# Patient Record
Sex: FEMALE | Race: WHITE | NOT HISPANIC OR LATINO | Employment: FULL TIME | URBAN - NONMETROPOLITAN AREA
[De-identification: names, ages, dates, MRNs, and addresses within clinical notes are randomized per-mention and may not be internally consistent; named-entity substitution may affect disease eponyms.]

---

## 2017-03-03 ENCOUNTER — HISTORY (OUTPATIENT)
Dept: FAMILY MEDICINE | Facility: OTHER | Age: 32
End: 2017-03-03

## 2017-03-03 ENCOUNTER — AMBULATORY - GICH (OUTPATIENT)
Dept: FAMILY MEDICINE | Facility: OTHER | Age: 32
End: 2017-03-03

## 2017-03-03 DIAGNOSIS — Z30.46 ENCOUNTER FOR SURVEILLANCE OF IMPLANTABLE SUBDERMAL CONTRACEPTIVE: ICD-10-CM

## 2017-06-05 ENCOUNTER — OFFICE VISIT - GICH (OUTPATIENT)
Dept: FAMILY MEDICINE | Facility: OTHER | Age: 32
End: 2017-06-05

## 2017-06-05 ENCOUNTER — HISTORY (OUTPATIENT)
Dept: FAMILY MEDICINE | Facility: OTHER | Age: 32
End: 2017-06-05

## 2017-06-05 DIAGNOSIS — J02.9 ACUTE PHARYNGITIS: ICD-10-CM

## 2017-06-05 DIAGNOSIS — J02.0 STREPTOCOCCAL PHARYNGITIS: ICD-10-CM

## 2017-06-05 LAB — STREP A ANTIGEN - HISTORICAL: POSITIVE

## 2017-12-22 ENCOUNTER — COMMUNICATION - GICH (OUTPATIENT)
Dept: FAMILY MEDICINE | Facility: OTHER | Age: 32
End: 2017-12-22

## 2017-12-22 DIAGNOSIS — Z87.891 PERSONAL HISTORY OF NICOTINE DEPENDENCE: ICD-10-CM

## 2017-12-27 NOTE — PROGRESS NOTES
Patient Information     Patient Name MRN Sex Alexa Bean [id number] Female 1985      Progress Notes by Margaret Gutierrez NP at 2017 10:00 AM     Author:  Margaret Gutierrez NP Service:  (none) Author Type:  PHYS- Nurse Practitioner     Filed:  2017 11:36 AM Encounter Date:  2017 Status:  Signed     :  Margaret Gutierrez NP (PHYS- Nurse Practitioner)            SUBJECTIVE:    Alexa Abdalla is a 32 y.o. female who presents for sore throat, body ache, uncertain of fever has been taking ibuprofen regularly for sore throat, onset over the weekend. Has a 2-year-old son who is in ,  was close last week for strep. Denies any rash, cough or respiratory symptoms. Has been taking fluids without difficulty.    HPI    No Known Allergies,   Family History       Problem   Relation Age of Onset     Genetic  Other      Positive for HTN       Alcoholism  No Family History    ,   Current Outpatient Prescriptions on File Prior to Visit       Medication  Sig Dispense Refill     acyclovir (ZOVIRAX) 800 mg tablet Take 1 tablet by mouth 2 times daily. 30 tablet 2     norgestrel-ethinyl estradiol, 0.3-30 mg-mcg, (LOW-OGESTREL) 0.3-30 mg-mcg tablet Take 1 tablet by mouth once daily. 3 Packet 4     No current facility-administered medications on file prior to visit.    ,   Current Outpatient Prescriptions:      acyclovir (ZOVIRAX) 800 mg tablet, Take 1 tablet by mouth 2 times daily., Disp: 30 tablet, Rfl: 2     norgestrel-ethinyl estradiol, 0.3-30 mg-mcg, (LOW-OGESTREL) 0.3-30 mg-mcg tablet, Take 1 tablet by mouth once daily., Disp: 3 Packet, Rfl: 4  Medications have been reviewed by me and are current to the best of my knowledge and ability., ,   Patient Active Problem List       Diagnosis  Date Noted     HSV-type I-lip       Type 1, lip        ,   Past Surgical History:      Procedure  Laterality Date     PAST SURGICAL HISTORY      None       NE  DELIVERY W POSTPARTUM CARE  2015            "and   Social History        Substance Use Topics          Smoking status:   Current Every Day Smoker      Packs/day:  0.75      Start date:  11/2/2013      Smokeless tobacco:   Never Used       Comment: Pt quit 6 weeks ago.        Alcohol use   Yes      Comment: Socially         REVIEW OF SYSTEMS:  Review of Systems   Constitutional: Positive for malaise/fatigue.   HENT: Positive for sore throat.    Eyes: Negative.    Respiratory: Negative.    Cardiovascular: Negative.    Gastrointestinal: Negative.    Genitourinary: Negative.    Musculoskeletal: Negative.    Skin: Negative.    Neurological: Negative.    Endo/Heme/Allergies: Negative.    Psychiatric/Behavioral: Negative.        OBJECTIVE:  /60  Pulse 84  Temp 98.6  F (37  C) (Temporal)  Ht 1.63 m (5' 4.17\")  Wt 69.9 kg (154 lb 2 oz)  LMP  (Approximate)  Breastfeeding? No  BMI 26.31 kg/m2    EXAM:   Physical Exam   Constitutional: She is oriented to person, place, and time and well-developed, well-nourished, and in no distress.   HENT:   Head: Normocephalic and atraumatic.   Posterior pharynx injected with bilateral equal tonsillar hypertrophy about 2+, 4 mm pustule medial aspect of right tonsil, uvula midline without edema   Neck: Normal range of motion. Neck supple.   Cardiovascular: Normal rate.    Pulmonary/Chest: Effort normal.   Musculoskeletal: Normal range of motion.   Lymphadenopathy:     She has cervical adenopathy.   Neurological: She is alert and oriented to person, place, and time. Gait normal.   Skin: Skin is warm and dry.   Psychiatric: Mood, memory, affect and judgment normal.   Nursing note and vitals reviewed.      ASSESSMENT/PLAN:    ICD-10-CM    1. Strep pharyngitis J02.0 penicillin g procaine & benzathine 1,200,000 Units injection (BICILLIN-CR)   2. Sore throat J02.9 RAPID STREP WITH REFLEX CULTURE      RAPID STREP WITH REFLEX CULTURE      penicillin g procaine & benzathine 1,200,000 Units injection (BICILLIN-CR)      Results for " orders placed or performed in visit on 06/05/17      RAPID STREP WITH REFLEX CULTURE      Result  Value Ref Range    STREP A ANTIGEN           Positive (A) Negative     Will treat with one-time injection penicillin    Plan:  Continue ibuprofen, frequent fluids and other over-the-counter supportive therapies for comfort    Discussed expected course and return to clinic if not showing improvement 24-48 hours, sooner if any difficulty swallowing or managing saliva    Given hardcopy prescription for 2-year-old son due to exposure and sharing food and liquid items over weekend    Discussed prevention of transmission and okay to return to work and 24 hours after antibiotics started    Will offer treatment if  become symptomatic

## 2017-12-28 NOTE — PATIENT INSTRUCTIONS
Patient Information     Patient Name MRN Alexa Parks [id number] Female 1985      Patient Instructions by Margaret Gutierrez NP at 2017 10:00 AM     Author:  Margaret Gutierrez NP  Service:  (none) Author Type:  PHYS- Nurse Practitioner     Filed:  2017 10:36 AM  Encounter Date:  2017 Status:  Addendum     :  Margaret Gutierrez NP (PHYS- Nurse Practitioner)        Related Notes: Original Note by Margaret Gutierrez NP (PHYS- Nurse Practitioner) filed at 2017 10:36 AM               Index [employer] Related topics   Strep Throat   ________________________________________________________________________  KEY POINTS    Strep throat is an infection of the throat caused by bacteria called group A streptococcus. It is very contagious.    If your healthcare provider suspects you have strep, he or she may prescribe an antibiotic before you have all the results from the lab tests. This medicine may be taken as pills or given as a shot.    Follow the full treatment prescribed by your healthcare provider. If you are taking an antibiotic, take the medicine for as long as your healthcare provider prescribes, even if you feel better. If you stop taking the medicine too soon, you may not kill all of the bacteria and you may get sick again.    Wash your hands often and especially after using the restroom, coughing, sneezing, or blowing your nose. Also wash your hands before eating or touching your mouth, nose, or eyes.  ________________________________________________________________________  What is strep throat?  Strep throat is an infection of the throat caused by bacteria called group A streptococcus.  What is the cause?  Strep infections are very contagious. Strep is passed to others by sneezing, coughing, or touching something with the bacteria on it. Strep can be on surfaces such as toys, cups or plates, doorknobs, or telephones.   Strep throat is common in school-age children. Children under 2 years  old and adults who are not exposed to children are much less likely to get strep throat. Strep is most common from November through April, but it can happen any time of year.  What are the symptoms?  Symptoms may include:    Severe sore throat and painful swallowing    Swollen, tender lymph nodes in the neck    Fever    Headaches    Fine red rash on trunk and arms    Muscle aches    Swollen, red tonsils with white patches  How is it diagnosed?  Your healthcare provider will ask about your symptoms and medical history and examine you. Usually you will have a strep test. Your provider will rub a cotton swab against the back of your throat to get a sample for testing. The results may be available in an hour or less. In some cases, the swab is sent to the lab and tested. If you have an infection, it may take several days to find out what kind of germ is causing it. Knowing what germ is causing the infection helps your provider choose the right medicine to treat it.  How is it treated?  If your healthcare provider suspects you have strep, he or she may prescribe an antibiotic before you have all the results from the lab tests. This medicine may be taken as pills or given as a shot.  The symptoms of strep throat may go away as soon as 24 hours after you start treatment. The symptoms rarely last longer than 5 days.  It is very important to treat strep throat. Not getting treatment for strep throat or not taking all the medicine prescribed can lead to complications that may involve the heart and kidneys.  How can I take care of myself?  Follow the full treatment prescribed by your healthcare provider. In addition:    If you are taking an antibiotic, take the medicine for as long as your healthcare provider prescribes, even if you feel better. If you stop taking the medicine too soon, you may not kill all of the bacteria and you may get sick again.    For a sore throat:    Make sure you have enough fluids. Drink clear soup,  cold drinks, and other clear, nutritious liquids. If eating hurts your throat, don't force yourself to eat solid food. When you are able to eat more foods, choose healthy food to give you strength and to help fight the infection.    Gargle with salt water. You can make your own by mixing 1/2 teaspoon salt with 1 cup of water.    Suck on lozenges or hard candy.    Don't talk a lot. Rest your voice.    Use a humidifier to put more moisture in the air. Avoid steam vaporizers because they can cause burns. Be sure to keep the humidifier clean, as recommended in the 's instructions. It's important to keep bacteria and mold from growing in the water container.    Put warm compresses on your neck.    Do not smoke. Do not breathe second-hand smoke.    If you have a fever, rest and limit your activities until the fever is gone.    Ask your healthcare provider if you can take acetaminophen, aspirin, or ibuprofen to reduce your fever and to relieve pain. Read the label and take as directed. Unless recommended by your healthcare provider, you should not take these medicines for more than 10 days.    Nonsteroidal anti-inflammatory medicines (NSAIDs), such as ibuprofen, naproxen, and aspirin, may cause stomach bleeding and other problems. These risks increase with age.    Acetaminophen may cause liver damage or other problems. Unless recommended by your provider, don't take more than 3000 milligrams (mg) in 24 hours. To make sure you don t take too much, check other medicines you take to see if they also contain acetaminophen. Ask your provider if you need to avoid drinking alcohol while taking this medicine.  Ask your provider:    How and when you will get your test results    How long it will take to recover    If there are activities you should avoid and when you can return to your normal activities    How to take care of yourself at home    What symptoms or problems you should watch for and what to do if you have  them  Make sure you know when you should come back for a checkup. Keep all appointments for provider visits or tests.  How can I help prevent strep throat?   The following suggestions may help you prevent spread of a strep infection to others.    Wash your hands often and especially after using the restroom, coughing, sneezing, or blowing your nose. Also wash your hands before eating or touching your mouth, nose, or eyes.    Avoid close contact with other people, especially kissing and hugging, until you have taken the antibiotic for 24 to 48 hours.    Use paper cups, or separate cups, and paper towels in bathrooms instead of shared drinking cups and hand towels.    Don t share food and eating utensils with others.    Be careful not to let your nose or mouth touch drinking fountains.  Developed by [employer].  Adult Advisor 2016.3 published by [employer].  Last modified: 2016-06-07  Last reviewed: 2014-09-24  This content is reviewed periodically and is subject to change as new health information becomes available. The information is intended to inform and educate and is not a replacement for medical evaluation, advice, diagnosis or treatment by a healthcare professional.  References   Adult Advisor 2016.3 Index    Copyright   2016 [employer], a division of [employer] All rights reserved.

## 2017-12-30 NOTE — NURSING NOTE
Patient Information     Patient Name MRN Alexa Parks [id number] Female 1985      Nursing Note by Karla Mcgraw at 2017 10:00 AM     Author:  Karla Mcgraw Service:  (none) Author Type:  (none)     Filed:  2017 10:21 AM Encounter Date:  2017 Status:  Signed     :  Karla Mcgraw            Patient presents to clinic today for sore throat starting last Friday. She states the discomfort is also radiating up into her ears.    Karla Mcgraw LPN...................2017  10:02 AM

## 2018-01-03 NOTE — PROGRESS NOTES
Patient Information     Patient Name MRN Sex Alexa Bean [id number] Female 1985      Progress Notes by Yudith Warner MD at 3/3/2017  2:15 PM     Author:  Yudith Warner MD Service:  (none) Author Type:  Physician     Filed:  3/3/2017  4:57 PM Encounter Date:  3/3/2017 Status:  Signed     :  Yudith Warner MD (Physician)            SUBJECTIVE:    Alexa Abdalla is a 31 y.o. female who presents for removal of Nexplanon.     HPI Comments: Patient was experiencing excessive menstrual spotting while using Nexplanon. Therefore she has been also on an OCP.  She would like the Nexplanon removed. She is not planning to get pregnant. She will continue to use the birth control pill.   is considering a vasectomy.       REVIEW OF SYSTEMS:  ROS see HPI, ROS otherwise negative.     OBJECTIVE:  /62  Pulse 60  Wt 70.9 kg (156 lb 6.4 oz)  BMI 26.85 kg/m2    EXAM:   Physical Exam   Constitutional: She is oriented to person, place, and time and well-developed, well-nourished, and in no distress.   Eyes: Conjunctivae are normal.   Cardiovascular: Normal rate.    Pulmonary/Chest: Effort normal.   Neurological: She is alert and oriented to person, place, and time.   Skin: No rash noted.   Psychiatric: Mood and affect normal.     L arm is positioned. Nexplanon is palpated easily in the subcutaneous tissue. Distal end is anesthestized with 2mL of lidocaine with epinephrine. Area is sterilized. A #15 blade scalpel is used to make a 1cm longitudinal incision. Nexplanon is dissected out through this site without difficulty. Patient tolerated the procedure well. A compression dressing was placed.           ASSESSMENT/PLAN:    ICD-10-CM    1. Nexplanon removal Z30.46 REMOVE DRUG IMPLANT DEVICE        Plan:  Remove compression dressing in 2-3 hours. Limited lifting in the next 24hours. Follow up as needed with questions and concerns.      Yudith Warner MD

## 2018-01-03 NOTE — NURSING NOTE
Patient Information     Patient Name MRN Alexa Parks [id number] Female 1985      Nursing Note by Dottie Olsen at 3/3/2017  2:15 PM     Author:  Dottie Olsen Service:  (none) Author Type:  (none)     Filed:  3/3/2017  2:59 PM Encounter Date:  3/3/2017 Status:  Signed     :  Dottie Olsen            Patient here for removal of nexplanon.   Dottie Olsen LPN ..........3/3/2017 2:16 PM

## 2018-01-26 VITALS
DIASTOLIC BLOOD PRESSURE: 60 MMHG | WEIGHT: 154.13 LBS | HEART RATE: 84 BPM | BODY MASS INDEX: 26.31 KG/M2 | SYSTOLIC BLOOD PRESSURE: 102 MMHG | HEIGHT: 64 IN | TEMPERATURE: 98.6 F

## 2018-01-26 VITALS
BODY MASS INDEX: 24.5 KG/M2 | SYSTOLIC BLOOD PRESSURE: 106 MMHG | DIASTOLIC BLOOD PRESSURE: 62 MMHG | HEART RATE: 60 BPM | WEIGHT: 156.4 LBS

## 2018-01-31 ENCOUNTER — COMMUNICATION - GICH (OUTPATIENT)
Dept: FAMILY MEDICINE | Facility: OTHER | Age: 33
End: 2018-01-31

## 2018-01-31 DIAGNOSIS — Z30.09 ENCOUNTER FOR OTHER GENERAL COUNSELING AND ADVICE ON CONTRACEPTION: ICD-10-CM

## 2018-02-12 NOTE — TELEPHONE ENCOUNTER
Patient Information     Patient Name MRN Alexa Parks [id number] Female 1985      Telephone Encounter by Katia Pires RN at 2017  4:45 PM     Author:  Katia Pires RN Service:  (none) Author Type:  NURS- Registered Nurse     Filed:  2017  4:47 PM Encounter Date:  2017 Status:  Signed     :  Katia Pires RN (NURS- Registered Nurse)            Medication discontinued 2017  Unable to complete prescription refill per RN Medication Refill Policy.................... Katia Pires RN ....................  2017   4:47 PM

## 2018-02-13 NOTE — TELEPHONE ENCOUNTER
Patient Information     Patient Name MRN Alexa Parks [id number] Female 1985      Telephone Encounter by Katia Pires RN at 2018  8:49 AM     Author:  Katia Pires RN Service:  (none) Author Type:  NURS- Registered Nurse     Filed:  2018  8:58 AM Encounter Date:  2018 Status:  Signed     :  Katia Pires RN (NURS- Registered Nurse)            Dr.Rutherford  Pt is requesting a refill of Acyclovir last filled 2016.  Please review and advise.  Pt is due for annual physical and was sent a reminder letter today.    This is a Refill request from: Brian  Name of Medication: Acyclovir  Quantity requested: 30  Last fill date: 2016  Due for refill: yes  Last visit with AXEL ANTOINE was on: 2016 in [place] [facility]  PCP:  Axel Antoine MD  Controlled Substance Agreement:  NA   Diagnosis r/t this medication request: contraception management     Unable to complete prescription refill per RN Medication Refill Policy.................... Katia Pires RN ....................  2018   8:55 AM      Hormones    Office visit in the past 12 months or per provider note.    Last visit with AXEL ANTOINE was on: 2016 in [place] [facility]  Next visit with AXEL ANTOIEN is on: No future appointment listed with this provider  Next visit with Family Practice is on: No future appointment listed in this department    Max refill for 12 months from last office visit or per provider note.    Patient is due for medication management appointment. Limited refill provided at this time. [employer] message and/or letter sent for reminder to patient. Prescription refilled per RN Medication Refill Policy.................... Katia Pires RN ....................  2018   8:57 AM

## 2018-02-14 ENCOUNTER — DOCUMENTATION ONLY (OUTPATIENT)
Dept: FAMILY MEDICINE | Facility: OTHER | Age: 33
End: 2018-02-14

## 2018-02-14 PROBLEM — B00.9 HERPES SIMPLEX VIRUS (HSV) INFECTION: Status: ACTIVE | Noted: 2018-02-14

## 2018-02-14 RX ORDER — ACYCLOVIR 800 MG/1
800 TABLET ORAL 2 TIMES DAILY
COMMUNITY
Start: 2016-12-01 | End: 2018-04-10

## 2018-04-10 ENCOUNTER — OFFICE VISIT (OUTPATIENT)
Dept: FAMILY MEDICINE | Facility: OTHER | Age: 33
End: 2018-04-10
Attending: FAMILY MEDICINE
Payer: COMMERCIAL

## 2018-04-10 VITALS
WEIGHT: 173 LBS | SYSTOLIC BLOOD PRESSURE: 122 MMHG | BODY MASS INDEX: 29.54 KG/M2 | DIASTOLIC BLOOD PRESSURE: 64 MMHG | HEART RATE: 74 BPM

## 2018-04-10 DIAGNOSIS — Z87.891 PERSONAL HISTORY OF TOBACCO USE, PRESENTING HAZARDS TO HEALTH: ICD-10-CM

## 2018-04-10 DIAGNOSIS — B00.9 HERPES SIMPLEX VIRUS (HSV) INFECTION: Primary | ICD-10-CM

## 2018-04-10 DIAGNOSIS — Z12.4 SCREENING FOR MALIGNANT NEOPLASM OF CERVIX: ICD-10-CM

## 2018-04-10 DIAGNOSIS — Z30.8 ENCOUNTER FOR OTHER CONTRACEPTIVE MANAGEMENT: ICD-10-CM

## 2018-04-10 DIAGNOSIS — Z56.6 STRESSFUL JOB: ICD-10-CM

## 2018-04-10 LAB
BASOPHILS # BLD AUTO: 0.1 10E9/L (ref 0–0.2)
BASOPHILS NFR BLD AUTO: 0.7 %
DIFFERENTIAL METHOD BLD: NORMAL
EOSINOPHIL # BLD AUTO: 0.2 10E9/L (ref 0–0.7)
EOSINOPHIL NFR BLD AUTO: 2.4 %
ERYTHROCYTE [DISTWIDTH] IN BLOOD BY AUTOMATED COUNT: 11.6 % (ref 10–15)
HCT VFR BLD AUTO: 41.7 % (ref 35–47)
HGB BLD-MCNC: 14.5 G/DL (ref 11.7–15.7)
IMM GRANULOCYTES # BLD: 0 10E9/L (ref 0–0.4)
IMM GRANULOCYTES NFR BLD: 0.4 %
LYMPHOCYTES # BLD AUTO: 1.7 10E9/L (ref 0.8–5.3)
LYMPHOCYTES NFR BLD AUTO: 22.2 %
MCH RBC QN AUTO: 33 PG (ref 26.5–33)
MCHC RBC AUTO-ENTMCNC: 34.8 G/DL (ref 31.5–36.5)
MCV RBC AUTO: 95 FL (ref 78–100)
MONOCYTES # BLD AUTO: 0.4 10E9/L (ref 0–1.3)
MONOCYTES NFR BLD AUTO: 4.9 %
NEUTROPHILS # BLD AUTO: 5.3 10E9/L (ref 1.6–8.3)
NEUTROPHILS NFR BLD AUTO: 69.4 %
PLATELET # BLD AUTO: 296 10E9/L (ref 150–450)
RBC # BLD AUTO: 4.4 10E12/L (ref 3.8–5.2)
WBC # BLD AUTO: 7.6 10E9/L (ref 4–11)

## 2018-04-10 PROCEDURE — 85025 COMPLETE CBC W/AUTO DIFF WBC: CPT | Performed by: FAMILY MEDICINE

## 2018-04-10 PROCEDURE — 36415 COLL VENOUS BLD VENIPUNCTURE: CPT | Performed by: FAMILY MEDICINE

## 2018-04-10 PROCEDURE — 99395 PREV VISIT EST AGE 18-39: CPT | Mod: 25 | Performed by: FAMILY MEDICINE

## 2018-04-10 PROCEDURE — 88142 CYTOPATH C/V THIN LAYER: CPT | Mod: ZL | Performed by: FAMILY MEDICINE

## 2018-04-10 PROCEDURE — G0123 SCREEN CERV/VAG THIN LAYER: HCPCS | Performed by: FAMILY MEDICINE

## 2018-04-10 RX ORDER — ACYCLOVIR 800 MG/1
800 TABLET ORAL 2 TIMES DAILY PRN
Qty: 50 TABLET | Refills: 3 | Status: SHIPPED | OUTPATIENT
Start: 2018-04-10 | End: 2019-06-14

## 2018-04-10 RX ORDER — BUPROPION HCL 150 MG
150 TABLET,SUSTAINED-RELEASE 12 HR ORAL 2 TIMES DAILY
Qty: 180 TABLET | Refills: 4 | Status: SHIPPED | OUTPATIENT
Start: 2018-04-10 | End: 2019-06-14

## 2018-04-10 RX ORDER — ACYCLOVIR 800 MG/1
800 TABLET ORAL
COMMUNITY
Start: 2018-02-04 | End: 2018-04-10

## 2018-04-10 SDOH — HEALTH STABILITY - MENTAL HEALTH: OTHER PHYSICAL AND MENTAL STRAIN RELATED TO WORK: Z56.6

## 2018-04-10 NOTE — LETTER
April 18, 2018      Alexa Abdalla  [address] [place] [address]        Dear Alexa,     Your Pap test returned normal.  This is expected, but is always good news.  The Pap test just needs to be repeated every 3 years.      Sincerely,        NOEMI ANTOINE MD

## 2018-04-10 NOTE — NURSING NOTE
Patient presents to clinic for annual exam  Ayleen Gonsalez ....................  4/10/2018   9:02 AM

## 2018-04-10 NOTE — PROGRESS NOTES
HPI-Comes in today for comprehensive evaluation.  She has been feeling reasonably well, but does note that she started smoking again.  She does not smoke much but a few cigarettes every few days.  She does not smoke every day.  She is interested in quitting again and did well when she was taking Wellbutrin.    She states that she loves her job but it is somewhat stressful as a  at [employer].  She noted that the Wellbutrin helped her anxiety and job stress as well.    She is on birth control pills and intends to stay with them for the time being.  She is considering the possibility of another pregnancy.  She had a  3 years ago.    She has a remote history in  of an abnormal Pap test.  This was done when she was going to school in [place] and she does not recall exactly the details.  She thinks she had a biopsy.  Pap tests have been normal since then.    She did have a postpartum anemia after her .  Hemoglobin has not been rechecked.  She was told when she tried to donate blood that her blood was somewhat low and so she was not selected as a donor.  She does note that periods are somewhat heavy for the first 2 days of her menstrual period but then are quite light.  She also uses occasional acyclovir for outbreaks of cold sores.    ROS:  See HPI.  Other than was noted above, comprehensive review of systems is negative.    History reviewed. No pertinent past medical history.  Past Surgical History:   Procedure Laterality Date     OTHER SURGICAL HISTORY      65700.0,PAST SURGICAL HISTORY,None     OTHER SURGICAL HISTORY      2015,60580.0,[place]  DELIVERY W POSTPARTUM CARE     Family History   Problem Relation Age of Onset     Genetic Disorder Other      Genetic,Positive for HTN     Alcoholism No family hx of      Alcoholism     Social History   Substance Use Topics     Smoking status: Current Every Day Smoker     Packs/day: 0.75     Start date: 2013     Smokeless  tobacco: Never Used      Comment: Quit smoking: Pt quit 6 weeks ago.     Alcohol use Yes      Comment: Alcoholic Drinks/day: Socially     No Known Allergies    Current Outpatient Prescriptions:      acyclovir (ZOVIRAX) 800 MG tablet, Take 1 tablet (800 mg) by mouth 2 times daily as needed, Disp: 50 tablet, Rfl: 3     norgestrel-ethinyl estradiol (LO/OVRAL) 0.3-30 MG-MCG per tablet, Take 1 tablet by mouth daily, Disp: 84 tablet, Rfl: 4     WELLBUTRIN  MG 12 hr tablet, Take 1 tablet (150 mg) by mouth 2 times daily, Disp: 180 tablet, Rfl: 4     [DISCONTINUED] acyclovir (ZOVIRAX) 800 MG tablet, Take 800 mg by mouth, Disp: , Rfl:      [DISCONTINUED] acyclovir (ZOVIRAX) 800 MG tablet, Take 800 mg by mouth 2 times daily, Disp: , Rfl:      [DISCONTINUED] norgestrel-ethinyl estradiol (LO/OVRAL) 0.3-30 MG-MCG per tablet, Take 1 tablet by mouth daily, Disp: , Rfl:     EXAM:she is a pleasant healthy-appearing 32 year old female in no apparent distress.  Sheanswers questions appropriately and appears well-kempt.  HEENT exam is within normal limits.  .  Neck is supple with good carotid pulses.  No bruits are heard.  No thyromegaly or adenopathy.  Lungs are clear withgood air movement.  No rales, rhonchi, or wheezes are heard--  Breasts are normal in appearance, palpably free of masses.    There is no skin dimpling or nipple changes and axilla are negative.  There is mild bilateral breast tenderness.  Cardiac exam reveals aregular rhythm with no murmur or gallop appreciated.  Peripheral pulses are equal and strong  Abdomen-soft and nontender with no hepatosplenomegaly or masses.  Bowel sounds are normal and no bruits are heard  Back isstraight with no scoliosis or CVA tenderness.     exam reveals normal external genitalia.  Vagina is normal. Cervix is normal in appearance.  Pap smear was done..  Uterus is anterior, mobile and nontender..  Both adnexa are normal.     Extremities no edema.  Good distal pulses.  Neurologic  exam is intactand nonfocal  Skin is free of significant lesions.  No rashes are seen  Lymph nodes are nonpalpable in cervical, axillary, and inguinal areas.  Psychiatric: Alert and oriented with normal mood and affect.  Mildly anxious.    Lab-CBC is normal with a hemoglobin of 14.5.  Imaging-none indicated.    Assessment-preventive healthcare-normal exam.  Contraception is accomplished with birth control pills and these were refilled for another year.    History of postpartum anemia-resolved    Job stress and mild anxiety with associated smoking.  She did well with Wellbutrin in the past and this will be refilled.    Plan-she will be notified of her Pap and lab results.  If all goes well, follow-up at yearly intervals.    NOEMI ANTOINE ....................  4/10/2018   11:46 AM

## 2018-04-10 NOTE — MR AVS SNAPSHOT
"              After Visit Summary   4/10/2018    Alexa Abdalla    MRN: [id number]           Patient Information     Date Of Birth          1985        Visit Information        Provider Department      4/10/2018 9:15 AM Axel Colvin MD [facility]        Today's Diagnoses     Herpes simplex virus (HSV) infection    -  1    Stressful job        Encounter for other contraceptive management        Personal history of tobacco use, presenting hazards to health        Postpartum anemia        Screening for malignant neoplasm of cervix           Follow-ups after your visit        Who to contact     If you have questions or need follow up information about today's clinic visit or your schedule please contact [facility] directly at [phone].  Normal or non-critical lab and imaging results will be communicated to you by MyChart, letter or phone within 4 business days after the clinic has received the results. If you do not hear from us within 7 days, please contact the clinic through [employer] or phone. If you have a critical or abnormal lab result, we will notify you by phone as soon as possible.  Submit refill requests through [employer] or call your pharmacy and they will forward the refill request to us. Please allow 3 business days for your refill to be completed.          Additional Information About Your Visit        MyChart Information     [employer] lets you send messages to your doctor, view your test results, renew your prescriptions, schedule appointments and more. To sign up, go to [email or web address] . Click on \"Log in\" on the left side of the screen, which will take you to the Welcome page. Then click on \"Sign up Now\" on the right side of the page.     You will be asked to enter the access code listed below, as well as some personal information. Please follow the directions to create your username and password.     Your access code is: " [email or web address]  Expires: 2018 11:51 AM     Your access code will  in 90 days. If you need help or a new code, please call your [facility] clinic or [phone].        Care EveryWhere ID     This is your Care EveryWhere ID. This could be used by other organizations to access your [facility] medical records  [id number]        Your Vitals Were     Pulse Last Period BMI (Body Mass Index)             74 2018 29.54 kg/m2          Blood Pressure from Last 3 Encounters:   04/10/18 122/64   17 102/60   17 106/62    Weight from Last 3 Encounters:   04/10/18 173 lb (78.5 kg)   17 154 lb 2 oz (69.9 kg)   17 156 lb 6.4 oz (70.9 kg)              We Performed the Following     CBC with platelets differential     Pap Screen Thin Prep with HPV - recommended age 30 - 65 years (select HPV order below)          Today's Medication Changes          These changes are accurate as of 4/10/18  4:43 PM.  If you have any questions, ask your nurse or doctor.               Start taking these medicines.        Dose/Directions    WELLBUTRIN  MG 12 hr tablet   Used for:  Stressful job   Generic drug:  buPROPion   Started by:  Axel Colvin MD        Dose:  150 mg   Take 1 tablet (150 mg) by mouth 2 times daily   Quantity:  180 tablet   Refills:  4         These medicines have changed or have updated prescriptions.        Dose/Directions    acyclovir 800 MG tablet   Commonly known as:  ZOVIRAX   This may have changed:    - when to take this  - reasons to take this   Used for:  Herpes simplex virus (HSV) infection   Changed by:  Axel Colvin MD        Dose:  800 mg   Take 1 tablet (800 mg) by mouth 2 times daily as needed   Quantity:  50 tablet   Refills:  3            Where to get your medicines      These medications were sent to [id number] - [place], [place] -  AT [address] , [facility] [address]     Phone:  [phone]      acyclovir 800 MG tablet    norgestrel-ethinyl estradiol 0.3-30 MG-MCG per tablet    WELLBUTRIN  MG 12 hr tablet                Primary Care Provider Office Phone # Fax #    Axel Colvin [phone] [address]   [facility] [address]        Equal Access to Services     [facility] : Hadii aad ku hadasho Soomaali, waaxda luqadaha, qaybta kaalmada adeegyada, fahad naqvi hayceen kaci magañamercedessondra pantoja . So [facility] [phone].    ATENCIÓN: Si habla español, tiene a iverson disposición servicios gratuitos de asistencia lingüística. Llame al [phone].    We comply with applicable federal civil rights laws and [place] laws. We do not discriminate on the basis of race, color, national origin, age, disability, sex, sexual orientation, or gender identity.            Thank you!     Thank you for choosing [facility] AND [facility]  for your care. Our goal is always to provide you with excellent care. Hearing back from our patients is one way we can continue to improve our services. Please take a few minutes to complete the written survey that you may receive in the mail after your visit with us. Thank you!             Your Updated Medication List - Protect others around you: Learn how to safely use, store and throw away your medicines at www.disposemymeds.org.          This list is accurate as of 4/10/18  4:43 PM.  Always use your most recent med list.                   Brand Name Dispense Instructions for use Diagnosis    acyclovir 800 MG tablet    ZOVIRAX    50 tablet    Take 1 tablet (800 mg) by mouth 2 times daily as needed    Herpes simplex virus (HSV) infection       norgestrel-ethinyl estradiol 0.3-30 MG-MCG per tablet    LO/OVRAL    84 tablet    Take 1 tablet by mouth daily    Encounter for other contraceptive management       WELLBUTRIN  MG 12 hr tablet   Generic drug:  buPROPion     180 tablet    Take 1 tablet (150 mg) by mouth 2 times daily    Stressful job

## 2018-07-23 NOTE — PROGRESS NOTES
Patient Information     Patient Name  Alexa Abdalla MRN  [id number] Sex  Female   1985      Letter by Axel Colvin MD at      Author:  Axel Colvin MD Service:  (none) Author Type:  (none)    Filed:   Encounter Date:  2018 Status:  (Other)           Alexa Abdalla  [id number] [facility] [address]          2018    Dear Ms. Abdalla:    This letter is to remind you that you are due for your annual exam with Axel Colvin MD. Your last comprehensive visit was more than 12 months ago.    A LIMITED refill of   Orders Placed This Encounter      LOW-OGESTREL 0.3-30 mg-mcg tablet has been called into your pharmacy. Additional refills require you to complete this appointment.    Please call the clinic at [phone] to schedule your appointment.    If you should require additional refills before your scheduled appointment, please contact your pharmacy and we will refill your medication until the date of your appointment.    If you are no longer seeing Axel Colvin MD for primary care, please call to let us know. Doing so will remove you from our call/contact list.      Thank you for choosing [facility] and [facility] for your health care needs.    Sincerely,    Refill RN  [facility]

## 2019-06-14 ENCOUNTER — OFFICE VISIT (OUTPATIENT)
Dept: FAMILY MEDICINE | Facility: OTHER | Age: 34
End: 2019-06-14
Attending: NURSE PRACTITIONER
Payer: COMMERCIAL

## 2019-06-14 VITALS
RESPIRATION RATE: 16 BRPM | BODY MASS INDEX: 29.43 KG/M2 | HEART RATE: 74 BPM | SYSTOLIC BLOOD PRESSURE: 118 MMHG | HEIGHT: 64 IN | DIASTOLIC BLOOD PRESSURE: 68 MMHG | TEMPERATURE: 98.4 F | WEIGHT: 172.4 LBS

## 2019-06-14 DIAGNOSIS — Z72.0 TOBACCO ABUSE: ICD-10-CM

## 2019-06-14 DIAGNOSIS — B00.9 HERPES SIMPLEX VIRUS (HSV) INFECTION: ICD-10-CM

## 2019-06-14 DIAGNOSIS — N92.0 MENORRHAGIA WITH REGULAR CYCLE: Primary | ICD-10-CM

## 2019-06-14 DIAGNOSIS — Z30.8 ENCOUNTER FOR OTHER CONTRACEPTIVE MANAGEMENT: ICD-10-CM

## 2019-06-14 DIAGNOSIS — Z79.899 ENCOUNTER FOR MEDICATION REVIEW: ICD-10-CM

## 2019-06-14 DIAGNOSIS — Z56.6 STRESSFUL JOB: ICD-10-CM

## 2019-06-14 PROCEDURE — 99213 OFFICE O/P EST LOW 20 MIN: CPT | Performed by: NURSE PRACTITIONER

## 2019-06-14 RX ORDER — ACYCLOVIR 800 MG/1
800 TABLET ORAL 2 TIMES DAILY PRN
Qty: 50 TABLET | Refills: 3 | Status: SHIPPED | OUTPATIENT
Start: 2019-06-14 | End: 2024-07-30

## 2019-06-14 RX ORDER — BUPROPION HCL 150 MG
150 TABLET,SUSTAINED-RELEASE 12 HR ORAL 2 TIMES DAILY
Qty: 180 TABLET | Refills: 4 | Status: SHIPPED | OUTPATIENT
Start: 2019-06-14 | End: 2022-01-27

## 2019-06-14 SDOH — HEALTH STABILITY - MENTAL HEALTH: OTHER PHYSICAL AND MENTAL STRAIN RELATED TO WORK: Z56.6

## 2019-06-14 SDOH — HEALTH STABILITY: MENTAL HEALTH: HOW OFTEN DO YOU HAVE 6 OR MORE DRINKS ON ONE OCCASION?: MONTHLY

## 2019-06-14 SDOH — HEALTH STABILITY: MENTAL HEALTH: HOW OFTEN DO YOU HAVE A DRINK CONTAINING ALCOHOL?: 4 OR MORE TIMES A WEEK

## 2019-06-14 SDOH — HEALTH STABILITY: MENTAL HEALTH: HOW MANY STANDARD DRINKS CONTAINING ALCOHOL DO YOU HAVE ON A TYPICAL DAY?: 1 OR 2

## 2019-06-14 ASSESSMENT — MIFFLIN-ST. JEOR: SCORE: 1462

## 2019-06-14 ASSESSMENT — PAIN SCALES - GENERAL: PAINLEVEL: NO PAIN (0)

## 2019-06-14 NOTE — PROGRESS NOTES
Nursing Notes:   Rishi Olson LPN  6/14/2019  9:50 AM  Signed  Patient presents to clinic today for refill on BC. She has no major concerns.    No LMP recorded.  Medication Reconciliation: complete    Rishi Olson LPN  6/14/2019 9:41 AM    Nursing note reviewed with patient.  Accurracy and completeness verified.   Ms. Abdalla is a 34 year old female who:  Patient presents with:  Recheck Medication      ICD-10-CM    1. Menorrhagia with regular cycle N92.0 OB/GYN REFERRAL   2. Herpes simplex virus (HSV) infection B00.9 acyclovir (ZOVIRAX) 800 MG tablet   3. Encounter for other contraceptive management Z30.8 norgestrel-ethinyl estradiol (LO/OVRAL) 0.3-30 MG-MCG tablet   4. Stressful job Z56.6 WELLBUTRIN  MG 12 hr tablet   5. Tobacco abuse Z72.0 OB/GYN REFERRAL   6. Encounter for medication review Z79.899      HPI   Presents for medication refill and to discuss options regarding menorrhagia reports periods  Have been heavy and crampy almost her entire life--- oral contraceptives have worked to help decrease discomfort and flow however she still has significant flow on her week off of pills  She does continue to smoke 34 years old, --- her  recently had a vasectomy and is waiting for final clearance  She does not need contraception she needs menorrhagia control--tired of hormonal methods and would like a nonhormonal treatment of possible  Has tried Nexplanon--did not like this at all had constant bleeding    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   All other systems reviewed and are negative.     All other  systems reviewed and negative.     THERON:   No flowsheet data found.  PHQ9:  PHQ-9 SCORE 2016   PHQ-9 Total Score 3       I have personally reviewed the past medical history, past surgical history, medications, allergies, family and social history as listed below, on 2019.    No Known Allergies    Current Outpatient Medications   Medication Sig Dispense Refill     acyclovir (ZOVIRAX) 800 MG tablet Take 1 tablet (800 mg) by mouth 2 times daily as needed (onset cold sores) 50 tablet 3     norgestrel-ethinyl estradiol (LO/OVRAL) 0.3-30 MG-MCG tablet Take 1 tablet by mouth daily 84 tablet 1     WELLBUTRIN  MG 12 hr tablet Take 1 tablet (150 mg) by mouth 2 times daily 180 tablet 4        Patient Active Problem List    Diagnosis Date Noted     Herpes simplex virus (HSV) infection 2018     Priority: Medium     Overview:   Type 1, lip       No past medical history on file.  Past Surgical History:   Procedure Laterality Date     OTHER SURGICAL HISTORY      59455.0,PAST SURGICAL HISTORY,None     OTHER SURGICAL HISTORY      2015,84574.0,[place]  DELIVERY W POSTPARTUM CARE     Social History     Socioeconomic History     Marital status: Single     Spouse name: None     Number of children: None     Years of education: None     Highest education level: None   Occupational History     None   Social Needs     Financial resource strain: None     Food insecurity:     Worry: None     Inability: None     Transportation needs:     Medical: None     Non-medical: None   Tobacco Use     Smoking status: Current Every Day Smoker     Packs/day: 0.75     Start date: 2013     Smokeless tobacco: Never Used     Tobacco comment: Quit smoking: Pt quit 6 weeks ago.   Substance and Sexual Activity     Alcohol use: Yes     Alcohol/week: 0.6 oz     Types: 1 Cans of beer per week     Frequency: 4 or more times a week     Drinks per session: 1 or 2     Binge frequency: Monthly     Comment: Alcoholic Drinks/day: Socially  "    Drug use: No     Comment: Drug use: No     Sexual activity: Yes     Partners: Male     Birth control/protection: Pill   Lifestyle     Physical activity:     Days per week: None     Minutes per session: None     Stress: None   Relationships     Social connections:     Talks on phone: None     Gets together: None     Attends Yazidi service: None     Active member of club or organization: None     Attends meetings of clubs or organizations: None     Relationship status: None     Intimate partner violence:     Fear of current or ex partner: None     Emotionally abused: None     Physically abused: None     Forced sexual activity: None   Other Topics Concern     Parent/sibling w/ CABG, MI or angioplasty before 65F 55M? Not Asked   Social History Narrative    Works as a  at [employer].  Has had the same sexual partner for several years.  Will be starting school in the fall of 2007 in social work at [employer]. Graduated with a degree in social work--full time at [employer].    Preloaded  12/19/2012     Family History   Problem Relation Age of Onset     Genetic Disorder Other         Genetic,Positive for HTN     Alcoholism No family hx of         Alcoholism       EXAM:   Vitals:    06/14/19 0942   BP: 118/68   BP Location: Right arm   Patient Position: Sitting   Cuff Size: Adult Regular   Pulse: 74   Resp: 16   Temp: 98.4  F (36.9  C)   TempSrc: Tympanic   Weight: 78.2 kg (172 lb 6.4 oz)   Height: 1.626 m (5' 4\")       Current Pain Score: No Pain (0)     BP Readings from Last 3 Encounters:   06/14/19 118/68   04/10/18 122/64   06/05/17 102/60      Wt Readings from Last 3 Encounters:   06/14/19 78.2 kg (172 lb 6.4 oz)   04/10/18 78.5 kg (173 lb)   06/05/17 69.9 kg (154 lb 2 oz)      Estimated body mass index is 29.59 kg/m  as calculated from the following:    Height as of this encounter: 1.626 m (5' 4\").    Weight as of this encounter: 78.2 kg (172 lb 6.4 oz).     Physical Exam   Constitutional: " She is oriented to person, place, and time. She appears well-developed and well-nourished.   Cardiovascular: Normal rate.   Pulmonary/Chest: Effort normal.   Musculoskeletal: Normal range of motion.   Neurological: She is alert and oriented to person, place, and time.   Skin: Skin is warm and dry.   Psychiatric: She has a normal mood and affect. Her behavior is normal. Judgment and thought content normal.   Nursing note and vitals reviewed.      INVESTIGATIONS:  Results for orders placed or performed in visit on 04/10/18   CBC with platelets differential   Result Value Ref Range    WBC 7.6 4.0 - 11.0 10e9/L    RBC Count 4.40 3.8 - 5.2 10e12/L    Hemoglobin 14.5 11.7 - 15.7 g/dL    Hematocrit 41.7 35.0 - 47.0 %    MCV 95 78 - 100 fl    MCH 33.0 26.5 - 33.0 pg    MCHC 34.8 31.5 - 36.5 g/dL    RDW 11.6 10.0 - 15.0 %    Platelet Count 296 150 - 450 10e9/L    Diff Method Automated Method     % Neutrophils 69.4 %    % Lymphocytes 22.2 %    % Monocytes 4.9 %    % Eosinophils 2.4 %    % Basophils 0.7 %    % Immature Granulocytes 0.4 %    Absolute Neutrophil 5.3 1.6 - 8.3 10e9/L    Absolute Lymphocytes 1.7 0.8 - 5.3 10e9/L    Absolute Monocytes 0.4 0.0 - 1.3 10e9/L    Absolute Eosinophils 0.2 0.0 - 0.7 10e9/L    Absolute Basophils 0.1 0.0 - 0.2 10e9/L    Abs Immature Granulocytes 0.0 0 - 0.4 10e9/L       ASSESSMENT AND PLAN:  Problem List Items Addressed This Visit        Infectious/Inflammatory    Herpes simplex virus (HSV) infection    Relevant Medications    acyclovir (ZOVIRAX) 800 MG tablet      Other Visit Diagnoses     Menorrhagia with regular cycle    -  Primary    Relevant Orders    OB/GYN REFERRAL    Encounter for other contraceptive management        Relevant Medications    norgestrel-ethinyl estradiol (LO/OVRAL) 0.3-30 MG-MCG tablet    Stressful job        Relevant Medications    WELLBUTRIN  MG 12 hr tablet    Tobacco abuse        Relevant Orders    OB/GYN REFERRAL    Encounter for medication review             Will send referral for gynecology consultation to discuss nonhormonal options for controlling menorrhagia    Given limited oral contraceptive refills for now until her  is clear from vasectomy procedure     Strongly encouraged tobacco cessation--plans to set a quit date in the future    Patient is reviewed and refilled as requested            -- Expected clinical course discussed    -- Medications and their side effects discussed    There are no Patient Instructions on file for this visit.  Margaret Gutierrez CNP  [facility]     Portions of this note were dictated using speech recognition software. The note has been proofread but errors in the text may have been overlooked. Please contact me if there are any concerns regarding the accuracy of the dictation.

## 2019-06-14 NOTE — NURSING NOTE
Patient presents to clinic today for refill on BC. She has no major concerns.    No LMP recorded.  Medication Reconciliation: complete    Rishi Olson LPN  6/14/2019 9:41 AM

## 2019-06-19 ENCOUNTER — OFFICE VISIT (OUTPATIENT)
Dept: OBGYN | Facility: OTHER | Age: 34
End: 2019-06-19
Attending: NURSE PRACTITIONER
Payer: COMMERCIAL

## 2019-06-19 VITALS
WEIGHT: 172 LBS | BODY MASS INDEX: 29.52 KG/M2 | HEART RATE: 72 BPM | SYSTOLIC BLOOD PRESSURE: 110 MMHG | DIASTOLIC BLOOD PRESSURE: 70 MMHG

## 2019-06-19 DIAGNOSIS — N92.0 MENORRHAGIA WITH REGULAR CYCLE: ICD-10-CM

## 2019-06-19 DIAGNOSIS — Z72.0 TOBACCO ABUSE: ICD-10-CM

## 2019-06-19 PROCEDURE — 99203 OFFICE O/P NEW LOW 30 MIN: CPT | Performed by: OBSTETRICS & GYNECOLOGY

## 2019-06-19 ASSESSMENT — PAIN SCALES - GENERAL: PAINLEVEL: NO PAIN (0)

## 2019-06-19 NOTE — PROGRESS NOTES
Alexa is a very pleasant 34-year-old para 1 referred by Georgie Gutierrez for discussion of menorrhagia.  Is always had a long-standing history of heavy menses.  Has basically been on OCPs since age 15 to control and for contraception.  On OCPs she will have 4 to 7 days of flow but off will typically have very crampy periods lasting 8 to 10 days.  Tried the Nexplanon but had persistent bleeding on this.  Delivered her one pregnancy by  section.  Her  had a vasectomy 2 months ago, and is due for follow-up sperm count in 1 month.  Continues to smoke two thirds of a pack a day.    Ongoing medical issues:  HSV  Tobacco use  Anxiety    Past surgical history   section    Allergies none    Medications: Low Ovral, acyclovir, Wellbutrin    Social history: , works full-time    GYN review of system is otherwise unremarkable    Physical exam: Limited to vitals  Patient alert orientated x3  Blood pressure 110/70, pulse 72, respirations 14    Assessment:  1.  Long-standing menorrhagia with dysmenorrhea, reasonably controlled on OCPs.  2.  Desire to go off of an OCP, largely due to her continued tobacco use.  3.   status post vasectomy.    Plan:  Reviewed OCPs, Mirena IUD, uterine ablation and laparoscopic hysterectomy in detail.  Risks benefits pros and cons side effects all discussed.  I feel she would be best served by a Mirena IUD and she is agreeable to this.  We will plan to see her back when she has her next menses for placement.  She will take ibuprofen preprocedure.  All questions answered.    30 minutes spent

## 2019-06-19 NOTE — NURSING NOTE
Chief Complaint   Patient presents with     Consult     Menorrhagia       Medication Reconciliation: completed   Dottie Tenorio LPN  6/19/2019 9:26 AM

## 2019-07-11 ENCOUNTER — OFFICE VISIT (OUTPATIENT)
Dept: OBGYN | Facility: OTHER | Age: 34
End: 2019-07-11
Attending: OBSTETRICS & GYNECOLOGY
Payer: COMMERCIAL

## 2019-07-11 VITALS
HEART RATE: 72 BPM | WEIGHT: 172 LBS | BODY MASS INDEX: 29.52 KG/M2 | DIASTOLIC BLOOD PRESSURE: 70 MMHG | SYSTOLIC BLOOD PRESSURE: 116 MMHG

## 2019-07-11 DIAGNOSIS — Z30.430 ENCOUNTER FOR IUD INSERTION: ICD-10-CM

## 2019-07-11 DIAGNOSIS — N92.0 EXCESSIVE OR FREQUENT MENSTRUATION: Primary | ICD-10-CM

## 2019-07-11 PROCEDURE — 58300 INSERT INTRAUTERINE DEVICE: CPT | Performed by: OBSTETRICS & GYNECOLOGY

## 2019-07-11 PROCEDURE — 25000125 ZZHC RX 250: Performed by: OBSTETRICS & GYNECOLOGY

## 2019-07-11 RX ADMIN — LEVONORGESTREL 20 MCG: 52 INTRAUTERINE DEVICE INTRAUTERINE at 11:08

## 2019-07-11 ASSESSMENT — PAIN SCALES - GENERAL: PAINLEVEL: NO PAIN (0)

## 2019-07-11 NOTE — NURSING NOTE
Chief Complaint   Patient presents with     IUD     Placement         had vasectomy    Prior to the start of the procedure and with procedural staff participation, I verbally confirmed the patient s identity using two indicators, relevant allergies, that the procedure was appropriate and matched the consent or emergent situation, and that the correct equipment/implants were available. Immediately prior to starting the procedure I conducted the Time Out with the procedural staff and re-confirmed the patient s name, procedure, and site/side. (The Joint Commission universal protocol was followed.)  Yes    Sedation (Moderate or Deep): None  Consent signed     Medication Reconciliation: completed   Dottie Tenorio LPN  7/11/2019 9:36 AM

## 2019-07-11 NOTE — PROGRESS NOTES
See note from 6/19/19  On menses at this time.    Routine pre-procedure instructions were given to the patient  A Timeout was then performed  Betadine prep to cervix  Cervix carefully dilated and the uterus sounded as needed.  A Mirena was placed to 7.5 cms, released in usual fashion and pressed against the fundus.  The strings were cut to 3 cms of length    The procedure was tolerated well  Routine instructions were then given.  F/U prn

## 2019-08-07 ENCOUNTER — TELEPHONE (OUTPATIENT)
Dept: OBGYN | Facility: OTHER | Age: 34
End: 2019-08-07

## 2019-08-07 NOTE — TELEPHONE ENCOUNTER
Patient called today with questions about recent IUD placement. She states she has questions about her bill/cost of service. She was referred to billing office. She states she already spoke to someone in that department. This nurse made contact with billing department and tried to clarify patient's concerns. Call placed back to patient and advised to contact billing again with her questions. Patient agrees.    Marcia Duncan RN...................8/7/2019 4:03 PM

## 2020-02-10 NOTE — ADDENDUM NOTE
Addended by: VIVIAN ARBOLEDA on: 7/11/2019 11:10 AM     Modules accepted: Orders     Blood work today in [address]    5 day Holter monitor today    We will arrange stress echo to be done here to look at your mitral valve in detail with exercise.     One of the labs we have ordered is for genetic testing to assess for your left ventricular non-compaction and your mitral valve disease. [employer] the genetic company we use will contact you if your out-of-pocket expense is more than $100. You can choose to not go through insurance in which case your cost would be $250.     Recommend taking antibiotics before the dentist or colonoscopy or urinary procedures.

## 2020-03-11 ENCOUNTER — HEALTH MAINTENANCE LETTER (OUTPATIENT)
Age: 35
End: 2020-03-11

## 2020-08-06 ENCOUNTER — RESULTS ONLY (OUTPATIENT)
Dept: LAB | Age: 35
End: 2020-08-06

## 2020-08-07 LAB
SARS-COV-2 RNA SPEC QL NAA+PROBE: NOT DETECTED
SPECIMEN SOURCE: NORMAL

## 2020-11-24 ENCOUNTER — RESULTS ONLY (OUTPATIENT)
Dept: LAB | Age: 35
End: 2020-11-24

## 2020-11-24 LAB
SARS-COV-2 RNA SPEC QL NAA+PROBE: NORMAL
SPECIMEN SOURCE: NORMAL

## 2020-11-25 LAB
LABORATORY COMMENT REPORT: NORMAL
SARS-COV-2 RNA SPEC QL NAA+PROBE: NEGATIVE
SPECIMEN SOURCE: NORMAL

## 2021-01-03 ENCOUNTER — HEALTH MAINTENANCE LETTER (OUTPATIENT)
Age: 36
End: 2021-01-03

## 2021-04-25 ENCOUNTER — HEALTH MAINTENANCE LETTER (OUTPATIENT)
Age: 36
End: 2021-04-25

## 2021-04-28 ENCOUNTER — RESULTS ONLY (OUTPATIENT)
Dept: LAB | Age: 36
End: 2021-04-28

## 2021-04-28 LAB
LABORATORY COMMENT REPORT: NORMAL
SARS-COV-2 RNA RESP QL NAA+PROBE: NEGATIVE
SPECIMEN SOURCE: NORMAL

## 2021-04-29 DIAGNOSIS — J20.9 ACUTE BRONCHITIS, UNSPECIFIED ORGANISM: Primary | ICD-10-CM

## 2021-04-29 DIAGNOSIS — Z72.0 TOBACCO USE: ICD-10-CM

## 2021-04-29 RX ORDER — ALBUTEROL SULFATE 90 UG/1
2 AEROSOL, METERED RESPIRATORY (INHALATION) EVERY 6 HOURS PRN
Qty: 18 G | Refills: 0 | Status: SHIPPED | OUTPATIENT
Start: 2021-04-29 | End: 2024-07-30

## 2021-04-29 RX ORDER — AZITHROMYCIN 250 MG/1
TABLET, FILM COATED ORAL
Qty: 6 TABLET | Refills: 0 | Status: SHIPPED | OUTPATIENT
Start: 2021-04-29 | End: 2021-05-04

## 2021-07-20 PROBLEM — R42 DIZZY SPELLS: Status: ACTIVE | Noted: 2021-07-20

## 2021-07-21 ENCOUNTER — OFFICE VISIT (OUTPATIENT)
Dept: INTERNAL MEDICINE | Facility: OTHER | Age: 36
End: 2021-07-21
Attending: NURSE PRACTITIONER

## 2021-07-21 VITALS
TEMPERATURE: 98.3 F | WEIGHT: 173.6 LBS | OXYGEN SATURATION: 97 % | HEART RATE: 90 BPM | RESPIRATION RATE: 16 BRPM | HEIGHT: 64 IN | DIASTOLIC BLOOD PRESSURE: 68 MMHG | SYSTOLIC BLOOD PRESSURE: 114 MMHG | BODY MASS INDEX: 29.64 KG/M2

## 2021-07-21 DIAGNOSIS — R42 DIZZY SPELLS: ICD-10-CM

## 2021-07-21 DIAGNOSIS — F41.9 ANXIETY: ICD-10-CM

## 2021-07-21 DIAGNOSIS — F51.04 PSYCHOPHYSIOLOGICAL INSOMNIA: Primary | ICD-10-CM

## 2021-07-21 LAB
ALBUMIN SERPL-MCNC: 4.6 G/DL (ref 3.5–5.7)
ALP SERPL-CCNC: 51 U/L (ref 34–104)
ALT SERPL W P-5'-P-CCNC: 18 U/L (ref 7–52)
ANION GAP SERPL CALCULATED.3IONS-SCNC: 7 MMOL/L (ref 3–14)
AST SERPL W P-5'-P-CCNC: 18 U/L (ref 13–39)
BASOPHILS # BLD AUTO: 0.1 10E3/UL (ref 0–0.2)
BASOPHILS NFR BLD AUTO: 1 %
BILIRUB SERPL-MCNC: 0.6 MG/DL (ref 0.3–1)
BUN SERPL-MCNC: 9 MG/DL (ref 7–25)
CALCIUM SERPL-MCNC: 10.4 MG/DL (ref 8.6–10.3)
CHLORIDE BLD-SCNC: 105 MMOL/L (ref 98–107)
CO2 SERPL-SCNC: 26 MMOL/L (ref 21–31)
CREAT SERPL-MCNC: 0.77 MG/DL (ref 0.6–1.2)
EOSINOPHIL # BLD AUTO: 0.3 10E3/UL (ref 0–0.7)
EOSINOPHIL NFR BLD AUTO: 3 %
ERYTHROCYTE [DISTWIDTH] IN BLOOD BY AUTOMATED COUNT: 11.9 % (ref 10–15)
GFR SERPL CREATININE-BSD FRML MDRD: >90 ML/MIN/1.73M2
GLUCOSE BLD-MCNC: 104 MG/DL (ref 70–105)
HBA1C MFR BLD: 5.4 % (ref 4–6.2)
HCT VFR BLD AUTO: 44.1 % (ref 35–47)
HGB BLD-MCNC: 15.6 G/DL (ref 11.7–15.7)
IMM GRANULOCYTES # BLD: 0 10E3/UL
IMM GRANULOCYTES NFR BLD: 0 %
LYMPHOCYTES # BLD AUTO: 2.1 10E3/UL (ref 0.8–5.3)
LYMPHOCYTES NFR BLD AUTO: 22 %
MCH RBC QN AUTO: 34.7 PG (ref 26.5–33)
MCHC RBC AUTO-ENTMCNC: 35.4 G/DL (ref 31.5–36.5)
MCV RBC AUTO: 98 FL (ref 78–100)
MONOCYTES # BLD AUTO: 0.7 10E3/UL (ref 0–1.3)
MONOCYTES NFR BLD AUTO: 7 %
NEUTROPHILS # BLD AUTO: 6.6 10E3/UL (ref 1.6–8.3)
NEUTROPHILS NFR BLD AUTO: 67 %
NRBC # BLD AUTO: 0 10E3/UL
NRBC BLD AUTO-RTO: 0 /100
PLATELET # BLD AUTO: 317 10E3/UL (ref 150–450)
POTASSIUM BLD-SCNC: 3.6 MMOL/L (ref 3.5–5.1)
PROT SERPL-MCNC: 7.4 G/DL (ref 6.4–8.9)
RBC # BLD AUTO: 4.5 10E6/UL (ref 3.8–5.2)
SODIUM SERPL-SCNC: 138 MMOL/L (ref 134–144)
TSH SERPL DL<=0.005 MIU/L-ACNC: 1.18 MU/L (ref 0.4–4)
WBC # BLD AUTO: 9.8 10E3/UL (ref 4–11)

## 2021-07-21 PROCEDURE — 84443 ASSAY THYROID STIM HORMONE: CPT | Mod: ZL | Performed by: NURSE PRACTITIONER

## 2021-07-21 PROCEDURE — 99213 OFFICE O/P EST LOW 20 MIN: CPT | Performed by: NURSE PRACTITIONER

## 2021-07-21 PROCEDURE — 86803 HEPATITIS C AB TEST: CPT | Mod: ZL | Performed by: NURSE PRACTITIONER

## 2021-07-21 PROCEDURE — 82040 ASSAY OF SERUM ALBUMIN: CPT | Mod: ZL | Performed by: NURSE PRACTITIONER

## 2021-07-21 PROCEDURE — 83036 HEMOGLOBIN GLYCOSYLATED A1C: CPT | Mod: ZL | Performed by: NURSE PRACTITIONER

## 2021-07-21 PROCEDURE — 85025 COMPLETE CBC W/AUTO DIFF WBC: CPT | Mod: ZL | Performed by: NURSE PRACTITIONER

## 2021-07-21 PROCEDURE — 36415 COLL VENOUS BLD VENIPUNCTURE: CPT | Mod: ZL | Performed by: NURSE PRACTITIONER

## 2021-07-21 RX ORDER — TRAZODONE HYDROCHLORIDE 50 MG/1
50 TABLET, FILM COATED ORAL
Qty: 30 TABLET | Refills: 0 | Status: SHIPPED | OUTPATIENT
Start: 2021-07-21 | End: 2021-11-24

## 2021-07-21 RX ORDER — CITALOPRAM HYDROBROMIDE 20 MG/1
20 TABLET ORAL DAILY
Qty: 30 TABLET | Refills: 0 | Status: SHIPPED | OUTPATIENT
Start: 2021-07-21 | End: 2021-11-24

## 2021-07-21 ASSESSMENT — ENCOUNTER SYMPTOMS
FATIGUE: 1
LIGHT-HEADEDNESS: 1
SLEEP DISTURBANCE: 1
ACTIVITY CHANGE: 1
DIZZINESS: 1
HEADACHES: 1
DECREASED CONCENTRATION: 1

## 2021-07-21 ASSESSMENT — MIFFLIN-ST. JEOR: SCORE: 1457.44

## 2021-07-21 ASSESSMENT — PAIN SCALES - GENERAL: PAINLEVEL: NO PAIN (0)

## 2021-07-21 NOTE — NURSING NOTE
"Chief Complaint   Patient presents with     Dizziness     Patient presents for dizziness that has been going on since 7/16/21; on the weekend nothing, then dizziness started again at work. Periods of dizziness begin before work, during work, after work.  Patient states she gets lightheaded, tunnel vision, blurry, and light changes to dark.    Initial /68 (BP Location: Right arm, Patient Position: Sitting, Cuff Size: Adult Regular)   Pulse 90   Temp 98.3  F (36.8  C) (Tympanic)   Resp 16   Wt 78.7 kg (173 lb 9.6 oz)   LMP  (LMP Unknown)   SpO2 97%   Breastfeeding No   BMI 29.80 kg/m   Estimated body mass index is 29.8 kg/m  as calculated from the following:    Height as of 6/14/19: 1.626 m (5' 4\").    Weight as of this encounter: 78.7 kg (173 lb 9.6 oz).  Medication Reconciliation: complete  FOOD SECURITY SCREENING QUESTIONS  Hunger Vital Signs:  Within the past 12 months we worried whether our food would run out before we got money to buy more. Never  Within the past 12 months the food we bought just didn't last and we didn't have money to get more. Never      Coco Child LPN    "

## 2021-07-21 NOTE — PROGRESS NOTES
"Alexa Abdalla  : 1985 Age: 36 year old Sex: female MRN: [id number]    CC:   Chief Complaint   Patient presents with     Dizziness     THERON Score:    No flowsheet data found.    PHQ-2 Score:     PHQ-2 (  [employer]) 2021   Q1: Little interest or pleasure in doing things 0 0   Q2: Feeling down, depressed or hopeless 0 0   PHQ-2 Score 0 0          Tobacco Use      Smoking status: Current Every Day Smoker        Packs/day: 0.75        Start date: 2013      Smokeless tobacco: Never Used      Tobacco comment: Quit smoking: Pt quit 6 weeks ago.    NURSE'S NOTES:    Nursing Notes:   Coco Child LPN  2021  3:04 PM  Signed  Chief Complaint   Patient presents with     Dizziness     Patient presents for dizziness that has been going on since 21; on the weekend nothing, then dizziness started again at work. Periods of dizziness begin before work, during work, after work.  Patient states she gets lightheaded, tunnel vision, blurry, and light changes to dark.    Initial /68 (BP Location: Right arm, Patient Position: Sitting, Cuff Size: Adult Regular)   Pulse 90   Temp 98.3  F (36.8  C) (Tympanic)   Resp 16   Wt 78.7 kg (173 lb 9.6 oz)   LMP  (LMP Unknown)   SpO2 97%   Breastfeeding No   BMI 29.80 kg/m   Estimated body mass index is 29.8 kg/m  as calculated from the following:    Height as of 19: 1.626 m (5' 4\").    Weight as of this encounter: 78.7 kg (173 lb 9.6 oz).  Medication Reconciliation: complete  FOOD SECURITY SCREENING QUESTIONS  Hunger Vital Signs:  Within the past 12 months we worried whether our food would run out before we got money to buy more. Never  Within the past 12 months the food we bought just didn't last and we didn't have money to get more. Never      Coco Child LPN       Nursing note reviewed with patient.  Accuracy and completeness verified.      SUBJECTIVE:                                                      HPI:   Alexa Abdalla presents " to the clinic today with complaints of episodes of dizziness. These episodes started consistently on Friday, July 16th. Reports rapid onset, while sitting. Denies any headaches. States she gets lightheaded, gets tunnel vision and veers to the right. Sometimes feels she is going to fall over, even while sitting.     Episodes have happened in the shower, when driving, mostly at desk at work.  Rarely happens at home, most recently this past Friday.    She does feel sleep is a big issues, has trouble shutting her mind off. Finds herself drinking to go to sleep. Is currently using diphenhydramine 25-50 mg at bedtime. Has tried melatonin with no relief.    Cannot remember last eye exam.     She just started a new job and states the lighting is different.  More stress.    Alexa Abdalla also presents with:    Patient Active Problem List   Diagnosis     Herpes simplex virus (HSV) infection     Dizzy spells     Current Code Status:  No Order    REVIEW OF SYSTEMS:    Review of Systems   Constitutional: Positive for activity change (due to dizziness) and fatigue.   Musculoskeletal: Positive for gait problem.   Neurological: Positive for dizziness, light-headedness and headaches (slight on Monday but it resolved itself).   Psychiatric/Behavioral: Positive for decreased concentration and sleep disturbance ( snores and she has difficulty shutting her mind off).   All other systems reviewed and are negative.    Problem List/PMH: Reviewed in EMR, and made relevant updates today.  Medications: Reviewed in EMR, and made relevant updates today.  Allergies: Reviewed in EMR, and made relevant updates today.    OBJECTIVE:                                                      /68 (BP Location: Right arm, Patient Position: Sitting, Cuff Size: Adult Regular)   Pulse 90   Temp 98.3  F (36.8  C) (Tympanic)   Resp 16   Wt 78.7 kg (173 lb 9.6 oz)   LMP  (LMP Unknown)   SpO2 97%   Breastfeeding No   BMI 29.80 kg/m       Current Pain Score:   No Pain (0)     Physical Exam  Vitals and nursing note reviewed.   Constitutional:       Appearance: Normal appearance.   HENT:      Head: Normocephalic.   Neurological:      Mental Status: She is alert and oriented to person, place, and time. Mental status is at baseline.          BP Readings from Last 3 Encounters:   07/21/21 114/68   07/11/19 116/70   06/19/19 110/70        Vitals:    07/21/21 1435   Weight: 78.7 kg (173 lb 9.6 oz)      The ASCVD Risk score (Hussein GRAVES Jr., et al., 2013) failed to calculate for the following reasons:    The 2013 ASCVD risk score is only valid for ages 40 to 79    Diagnostics Completed at this Visit:    Results for orders placed or performed in visit on 07/21/21   Comprehensive metabolic panel     Status: Abnormal   Result Value Ref Range    Sodium 138 134 - 144 mmol/L    Potassium 3.6 3.5 - 5.1 mmol/L    Chloride 105 98 - 107 mmol/L    Carbon Dioxide (CO2) 26 21 - 31 mmol/L    Anion Gap 7 3 - 14 mmol/L    Urea Nitrogen 9 7 - 25 mg/dL    Creatinine 0.77 0.60 - 1.20 mg/dL    Calcium 10.4 (H) 8.6 - 10.3 mg/dL    Glucose 104 70 - 105 mg/dL    Alkaline Phosphatase 51 34 - 104 U/L    AST 18 13 - 39 U/L    ALT 18 7 - 52 U/L    Protein Total 7.4 6.4 - 8.9 g/dL    Albumin 4.6 3.5 - 5.7 g/dL    Bilirubin Total 0.6 0.3 - 1.0 mg/dL    GFR Estimate >90 >60 mL/min/1.73m2   Hemoglobin A1c     Status: Normal   Result Value Ref Range    Hemoglobin A1C 5.4 4.0 - 6.2 %   TSH     Status: Normal   Result Value Ref Range    TSH 1.18 0.40 - 4.00 mU/L   CBC with platelets and differential     Status: Abnormal   Result Value Ref Range    WBC Count 9.8 4.0 - 11.0 10e3/uL    RBC Count 4.50 3.80 - 5.20 10e6/uL    Hemoglobin 15.6 11.7 - 15.7 g/dL    Hematocrit 44.1 35.0 - 47.0 %    MCV 98 78 - 100 fL    MCH 34.7 (H) 26.5 - 33.0 pg    MCHC 35.4 31.5 - 36.5 g/dL    RDW 11.9 10.0 - 15.0 %    Platelet Count 317 150 - 450 10e3/uL    % Neutrophils 67 %    % Lymphocytes 22 %    % Monocytes 7  %    % Eosinophils 3 %    % Basophils 1 %    % Immature Granulocytes 0 %    NRBCs per 100 WBC 0 <1 /100    Absolute Neutrophils 6.6 1.6 - 8.3 10e3/uL    Absolute Lymphocytes 2.1 0.8 - 5.3 10e3/uL    Absolute Monocytes 0.7 0.0 - 1.3 10e3/uL    Absolute Eosinophils 0.3 0.0 - 0.7 10e3/uL    Absolute Basophils 0.1 0.0 - 0.2 10e3/uL    Absolute Immature Granulocytes 0.0 <=0.0 10e3/uL    Absolute NRBCs 0.0 10e3/uL   CBC and Differential     Status: Abnormal    Narrative    The following orders were created for panel order CBC and Differential.  Procedure                               Abnormality         Status                     ---------                               -----------         ------                     CBC with platelets and d...[[id number]]  Abnormal            Final result                 Please view results for these tests on the individual orders.        ASSESSMENT AND PLAN:    Dizzy spells  We will get labs for evaluation.  Differentials include fatigue, ear infection, low blood sugars, thyroid disorder, lighting adjustment.  - Hepatitis C antibody, in process  - CBC and Differential, unremarkable with exception of slightly elevated MCH  - Comprehensive metabolic panel, unremarkable with slightly elevated calcium at 10.4  - Hemoglobin A1c, within normal range at 5.4  - TSH within normal range at 1.18    Psychophysiological insomnia  We will try a month of citalopram and trazodone at night to see if this assists with sleep and allowing her to shut her mind off.  She was advised to try to sleep in a different room than her  until he can be evaluated for sleep study.  - citalopram (CELEXA) 20 MG tablet  Dispense: 30 tablet; Refill: 0  - traZODone (DESYREL) 50 MG tablet  Dispense: 30 tablet; Refill: 0    Anxiety  Trial 30-day.  - citalopram (CELEXA) 20 MG tablet  Dispense: 30 tablet; Refill: 0     I explained my diagnostic considerations and recommendations to the patient, who voiced understanding and  agreement with the treatment plan. All questions were answered. We discussed potential side effects of any prescribed or recommended therapies, as well as expectations for response to treatments.    Patient was advised to allow up to 2 days for response on lab results via MyChart and or letter to be sent.    FOLLOW-UP:    Return in about 4 weeks (around 8/18/2021) for Recheck.     Clinic : [phone]  Appointment line: [phone]     DAX Valenzuela, AGNP-C  Internal Medicine  07/22/2021 5:45 PM  _____________________________________________________________________________________    Total time spent with this patient was 21 minutes which included chart review, visualization and interpretation of labs and/or images, time spent with patient, and documentation.

## 2021-07-23 LAB — HCV AB SERPL QL IA: NONREACTIVE

## 2021-10-09 ENCOUNTER — HEALTH MAINTENANCE LETTER (OUTPATIENT)
Age: 36
End: 2021-10-09

## 2021-11-24 ENCOUNTER — MYC REFILL (OUTPATIENT)
Dept: INTERNAL MEDICINE | Facility: OTHER | Age: 36
End: 2021-11-24

## 2021-11-24 DIAGNOSIS — F41.9 ANXIETY: ICD-10-CM

## 2021-11-24 DIAGNOSIS — F51.04 PSYCHOPHYSIOLOGICAL INSOMNIA: ICD-10-CM

## 2021-11-29 RX ORDER — TRAZODONE HYDROCHLORIDE 50 MG/1
50 TABLET, FILM COATED ORAL
Qty: 30 TABLET | Refills: 0 | Status: SHIPPED | OUTPATIENT
Start: 2021-11-29 | End: 2022-01-17

## 2021-11-29 RX ORDER — CITALOPRAM HYDROBROMIDE 20 MG/1
20 TABLET ORAL DAILY
Qty: 30 TABLET | Refills: 0 | Status: SHIPPED | OUTPATIENT
Start: 2021-11-29 | End: 2022-01-17

## 2021-11-29 NOTE — TELEPHONE ENCOUNTER
Routing refill request to provider for review/approval because:    LOV: 7/21/2021    Kiki Bolden RN on 11/29/2021 at 12:18 PM

## 2022-01-27 ENCOUNTER — OFFICE VISIT (OUTPATIENT)
Dept: FAMILY MEDICINE | Facility: OTHER | Age: 37
End: 2022-01-27
Attending: NURSE PRACTITIONER
Payer: COMMERCIAL

## 2022-01-27 VITALS
SYSTOLIC BLOOD PRESSURE: 122 MMHG | HEART RATE: 60 BPM | RESPIRATION RATE: 18 BRPM | OXYGEN SATURATION: 98 % | BODY MASS INDEX: 29.76 KG/M2 | DIASTOLIC BLOOD PRESSURE: 82 MMHG | WEIGHT: 173.4 LBS | TEMPERATURE: 98.1 F

## 2022-01-27 DIAGNOSIS — L01.00 IMPETIGO: Primary | ICD-10-CM

## 2022-01-27 DIAGNOSIS — H65.92 MIDDLE EAR EFFUSION, LEFT: ICD-10-CM

## 2022-01-27 PROCEDURE — 99213 OFFICE O/P EST LOW 20 MIN: CPT | Performed by: NURSE PRACTITIONER

## 2022-01-27 RX ORDER — MUPIROCIN 20 MG/G
OINTMENT TOPICAL 3 TIMES DAILY
Qty: 15 G | Refills: 0 | Status: SHIPPED | OUTPATIENT
Start: 2022-01-27 | End: 2022-02-01

## 2022-01-27 ASSESSMENT — PATIENT HEALTH QUESTIONNAIRE - PHQ9: SUM OF ALL RESPONSES TO PHQ QUESTIONS 1-9: 0

## 2022-01-27 ASSESSMENT — PAIN SCALES - GENERAL: PAINLEVEL: NO PAIN (1)

## 2022-01-27 NOTE — PROGRESS NOTES
ASSESSMENT/PLAN:    I have reviewed the nursing notes.  I have reviewed the findings, diagnosis, plan and need for follow up with the patient.    1. Impetigo  - mupirocin (BACTROBAN) 2 % external ointment; Apply topically 3 times daily for 5 days  Dispense: 15 g; Refill: 0  Impetigo is located on her earlobe as well as her right hand following exposure from her son. Treating with mupirocin topically x5 days.  I discussed with Alexa that this is unrelated to her middle ear effusion.    2. Middle ear effusion, left  Significant serous effusion of the left middle ear, suspect this is related to recent COVID-19 infection infection that caused her to have severe nasal congestion.  Recommend Flonase nasal spray daily and antihistamines oral over-the-counter decongestions.  Discussed that middle ear effusion may take 12 weeks to fully resolve spontaneously.  If she develops ear pain or any muffled hearing further concerns or worsening condition she should follow-up with her primary care provider regarding this.    Discussed warning signs/symptoms indicative of need to f/u    Follow up if symptoms persist or worsen or concerns    I explained my diagnostic considerations and recommendations to the patient, who voiced understanding and agreement with the treatment plan. All questions were answered. We discussed potential side effects of any prescribed or recommended therapies, as well as expectations for response to treatments.    Nicolasa Howe NP  1/27/2022  1:30 PM    HPI:   Alexa Abdalla is a 36 year old female who presents to [facility] today for concerns of ear pain today and drainage from the left ear x 7 days. The fluid is generally clear. She tested positive for covid on 1/10/22. She reports she was very congested at that time. She reports external ear tenderness; but otherwise no ear pain. There is a scab in the ear canal. She has put peroxide in there. No fever or chills. No other symptoms of upper  respiratory infection are present.  She also has a small scab on the right dorsal surface of her hand started from cat scratching her.  She also tells me that her son is a wrestler and recently had impetigo.    No past medical history on file.  Past Surgical History:   Procedure Laterality Date     OTHER SURGICAL HISTORY      23515.0,PAST SURGICAL HISTORY,None     OTHER SURGICAL HISTORY      2015,80158.0,[place]  DELIVERY W POSTPARTUM CARE     Social History     Tobacco Use     Smoking status: Current Every Day Smoker     Packs/day: 0.75     Start date: 2013     Smokeless tobacco: Never Used     Tobacco comment: Quit smoking: Pt quit 6 weeks ago.   Substance Use Topics     Alcohol use: Yes     Alcohol/week: 1.0 standard drink     Types: 1 Cans of beer per week     Comment: Alcoholic Drinks/day: Socially     Current Outpatient Medications   Medication Sig Dispense Refill     acyclovir (ZOVIRAX) 800 MG tablet Take 1 tablet (800 mg) by mouth 2 times daily as needed (onset cold sores) 50 tablet 3     albuterol (PROAIR HFA/PROVENTIL HFA/VENTOLIN HFA) 108 (90 Base) MCG/ACT inhaler Inhale 2 puffs into the lungs every 6 hours as needed for shortness of breath / dyspnea or wheezing (cough) 18 g 0     citalopram (CELEXA) 20 MG tablet Take 1 tablet (20 mg) by mouth daily 90 tablet 3     traZODone (DESYREL) 50 MG tablet Take 1 tablet (50 mg) by mouth nightly as needed for sleep 90 tablet 3     No Known Allergies  Past medical history, past surgical history, current medications and allergies reviewed and accurate to the best of my knowledge.      ROS:  Refer to HPI    /82   Pulse 60   Temp 98.1  F (36.7  C) (Temporal)   Resp 18   Wt 78.7 kg (173 lb 6.4 oz)   SpO2 98%   BMI 29.76 kg/m      EXAM:  General Appearance: Well appearing 36 year old female, appropriate appearance for age. No acute distress   Ears: Left TM intact, translucent with bony landmarks appreciated, no erythema, + large serous effusion,  no bulging, no purulence.  Sitting Right TM intact, translucent with bony landmarks appreciated, no erythema, no effusion, no bulging, no purulence.  + honey colored drainage consistent with impetigo present in the intertragic not sure of left ear.  Left auditory canal clear.  Right auditory canal clear.  Normal external ears, non tender.  Eyes: conjunctivae normal without erythema or irritation, corneas clear, no drainage or crusting, no eyelid swelling, pupils equal   Orophayrnx: moist mucous membranes, posterior pharynx without erythema, tonsils symmetric, no erythema, no exudates or petechiae, no post nasal drip seen, no trismus, voice clear.    Sinuses:  No sinus tenderness upon palpation of the frontal or maxillary sinuses  Nose:  Bilateral nares: no erythema, no edema, no drainage or congestion   Neck: supple without adenopathy  Respiratory: normal chest wall and respirations.  Normal effort.  Clear to auscultation bilaterally, no wheezing, crackles or rhonchi.  No increased work of breathing.  No cough appreciated.  Cardiac: RRR with no murmurs  Dermatological: + dorsal surface of right hand; small red scabbed area with serous fluid  Psychological: normal affect, alert, oriented, and pleasant.

## 2022-01-27 NOTE — PROGRESS NOTES
Patient here today for pain and drainage in left ear for about 7 days.  No OTC taken.  /82   Pulse 60   Temp 98.1  F (36.7  C) (Temporal)   Resp 18   Wt 78.7 kg (173 lb 6.4 oz)   SpO2 98%   BMI 29.76 kg/m  ;s    Diane Kwok LPN....................  1/27/2022   1:21 PM

## 2022-01-29 DIAGNOSIS — L01.1 IMPETIGINIZATION OF OTHER DERMATOSES: ICD-10-CM

## 2022-01-29 DIAGNOSIS — L01.03 BULLOUS IMPETIGO: Primary | ICD-10-CM

## 2022-01-29 RX ORDER — CEPHALEXIN 500 MG/1
500 CAPSULE ORAL 4 TIMES DAILY
Qty: 28 CAPSULE | Refills: 0 | Status: SHIPPED | OUTPATIENT
Start: 2022-01-29 | End: 2022-02-05

## 2022-03-22 DIAGNOSIS — Z86.19 HISTORY OF COLD SORES: Primary | ICD-10-CM

## 2022-03-22 RX ORDER — VALACYCLOVIR HYDROCHLORIDE 1 G/1
TABLET, FILM COATED ORAL
Qty: 30 TABLET | Refills: 3 | Status: SHIPPED | OUTPATIENT
Start: 2022-03-22 | End: 2024-06-19

## 2022-04-18 DIAGNOSIS — F51.04 PSYCHOPHYSIOLOGICAL INSOMNIA: ICD-10-CM

## 2022-04-18 DIAGNOSIS — F41.9 ANXIETY: ICD-10-CM

## 2022-04-18 RX ORDER — CITALOPRAM HYDROBROMIDE 20 MG/1
20 TABLET ORAL DAILY
Qty: 90 TABLET | Refills: 4 | Status: SHIPPED | OUTPATIENT
Start: 2022-04-18 | End: 2022-07-12

## 2022-04-18 RX ORDER — TRAZODONE HYDROCHLORIDE 50 MG/1
50 TABLET, FILM COATED ORAL
Qty: 90 TABLET | Refills: 4 | Status: SHIPPED | OUTPATIENT
Start: 2022-04-18 | End: 2022-07-12

## 2022-05-21 ENCOUNTER — HEALTH MAINTENANCE LETTER (OUTPATIENT)
Age: 37
End: 2022-05-21

## 2022-07-11 ENCOUNTER — E-VISIT (OUTPATIENT)
Dept: INTERNAL MEDICINE | Facility: OTHER | Age: 37
End: 2022-07-11
Payer: COMMERCIAL

## 2022-07-11 DIAGNOSIS — F41.9 ANXIETY: Primary | ICD-10-CM

## 2022-07-11 DIAGNOSIS — F51.04 PSYCHOPHYSIOLOGICAL INSOMNIA: ICD-10-CM

## 2022-07-11 PROCEDURE — 99421 OL DIG E/M SVC 5-10 MIN: CPT | Performed by: NURSE PRACTITIONER

## 2022-07-12 DIAGNOSIS — F41.9 ANXIETY: ICD-10-CM

## 2022-07-12 DIAGNOSIS — F51.04 PSYCHOPHYSIOLOGICAL INSOMNIA: ICD-10-CM

## 2022-07-12 RX ORDER — TRAZODONE HYDROCHLORIDE 50 MG/1
50 TABLET, FILM COATED ORAL
Qty: 90 TABLET | Refills: 4 | Status: SHIPPED | OUTPATIENT
Start: 2022-07-12 | End: 2022-11-14

## 2022-07-12 RX ORDER — CITALOPRAM HYDROBROMIDE 20 MG/1
20 TABLET ORAL DAILY
Qty: 90 TABLET | Refills: 4 | Status: SHIPPED | OUTPATIENT
Start: 2022-07-12 | End: 2023-07-31

## 2022-07-12 NOTE — PATIENT INSTRUCTIONS
Thank you for choosing us for your care. I have placed an order for a prescription so that you can start treatment. View your full visit summary for details by clicking on the link below. Your pharmacist will able to address any questions you may have about the medication.     If you're not feeling better within 5-7 days, please schedule an appointment.  You can schedule an appointment right here in [facility], or call [phone]  If the visit is for the same symptoms as your eVisit, we'll refund the cost of your eVisit if seen within seven days.

## 2022-09-17 ENCOUNTER — HEALTH MAINTENANCE LETTER (OUTPATIENT)
Age: 37
End: 2022-09-17

## 2022-11-14 ENCOUNTER — OFFICE VISIT (OUTPATIENT)
Dept: INTERNAL MEDICINE | Facility: OTHER | Age: 37
End: 2022-11-14
Attending: NURSE PRACTITIONER
Payer: COMMERCIAL

## 2022-11-14 VITALS
HEIGHT: 64 IN | RESPIRATION RATE: 14 BRPM | SYSTOLIC BLOOD PRESSURE: 126 MMHG | OXYGEN SATURATION: 98 % | DIASTOLIC BLOOD PRESSURE: 80 MMHG | HEART RATE: 87 BPM | TEMPERATURE: 97.9 F | WEIGHT: 175.4 LBS | BODY MASS INDEX: 29.94 KG/M2

## 2022-11-14 DIAGNOSIS — F51.04 PSYCHOPHYSIOLOGICAL INSOMNIA: ICD-10-CM

## 2022-11-14 DIAGNOSIS — Z72.0 TOBACCO ABUSE: Primary | ICD-10-CM

## 2022-11-14 PROCEDURE — 99214 OFFICE O/P EST MOD 30 MIN: CPT | Performed by: NURSE PRACTITIONER

## 2022-11-14 RX ORDER — TRAZODONE HYDROCHLORIDE 50 MG/1
100 TABLET, FILM COATED ORAL
Qty: 180 TABLET | Refills: 4 | Status: SHIPPED | OUTPATIENT
Start: 2022-11-14 | End: 2023-12-11

## 2022-11-14 ASSESSMENT — PAIN SCALES - GENERAL: PAINLEVEL: NO PAIN (0)

## 2022-11-14 NOTE — NURSING NOTE
"Chief Complaint   Patient presents with     Recheck Medication     Refills/smoking cessation       Initial /80 (BP Location: Right arm, Patient Position: Sitting, Cuff Size: Adult Regular)   Pulse 87   Temp 97.9  F (36.6  C) (Temporal)   Resp 14   Wt 79.6 kg (175 lb 6.4 oz)   SpO2 98%   BMI 30.11 kg/m   Estimated body mass index is 30.11 kg/m  as calculated from the following:    Height as of 7/21/21: 1.626 m (5' 4\").    Weight as of this encounter: 79.6 kg (175 lb 6.4 oz).     Medication Reconciliation: complete      FOOD SECURITY SCREENING QUESTIONS:    The next two questions are to help us understand your food security.  If you are feeling you need any assistance in this area, we have resources available to support you today.    Hunger Vital Signs:  Within the past 12 months we worried whether our food would run out before we got money to buy more. Never  Within the past 12 months the food we bought just didn't last and we didn't have money to get more. Never        Advance care plan reviewed      Coco Child LPN on 11/14/2022 at 3:10 PM      "

## 2022-11-14 NOTE — PROGRESS NOTES
Assessment & Plan     Tobacco abuse  Rx for Chantix sent in. Encouraged cessation.  - varenicline (CHANTIX BOWEN) 0.5 MG X 11 & 1 MG X 42 tablet  Dispense: 53 tablet; Refill: 0    Psychophysiological insomnia  May use 1-2 tablets at night for sleep.  - traZODone (DESYREL) 50 MG tablet  Dispense: 180 tablet; Refill: 4    Prescription drug management  35 minutes spent on the date of the encounter doing chart review, history and exam, documentation and further activities per the note    Nicotine/Tobacco Cessation:  She reports that she has been smoking cigarettes. She started smoking about 9 years ago. She has been smoking an average of 1 pack per day. She has never used smokeless tobacco.  Nicotine/Tobacco Cessation Plan:   Pharmacotherapies : varenicline (Chantix)    Return if symptoms worsen or fail to improve.    Nancy Mccollum NP  [facility] AND [facility]    Josh Liu is a 37 year old , presenting for the following health issues:  Recheck Medication (Refills/smoking cessation)    History of Present Illness       Reason for visit:  Tobacco sensation for quitting    She eats 0-1 servings of fruits and vegetables daily.She consumes 2 sweetened beverage(s) daily.She exercises with enough effort to increase her heart rate 10 to 19 minutes per day.  She exercises with enough effort to increase her heart rate 3 or less days per week.   She is taking medications regularly.     Review of Systems   Respiratory:        Continues to smoke, wants to try Chantix     Genitourinary: Positive for menstrual problem and vaginal bleeding (occasional).   All other systems reviewed and are negative.     She is sleeping much better since starting trazodone.  She currently is using 2 tablets and is sleeping quite well.  She needs a refill.    She is overdue for PAP/annual physical. Needs IUD removed (I do not do this). Recommend she see GYN or Family Practice Provider.    Objective    /80 (BP Location: Right  arm, Patient Position: Sitting, Cuff Size: Adult Regular)   Pulse 87   Temp 97.9  F (36.6  C) (Temporal)   Resp 14   Wt 79.6 kg (175 lb 6.4 oz)   SpO2 98%   BMI 30.11 kg/m    Body mass index is 30.11 kg/m .  Physical Exam  Vitals and nursing note reviewed.   Constitutional:       Appearance: Normal appearance.   HENT:      Head: Normocephalic and atraumatic.   Eyes:      Extraocular Movements: Extraocular movements intact.      Conjunctiva/sclera: Conjunctivae normal.   Cardiovascular:      Rate and Rhythm: Normal rate and regular rhythm.      Heart sounds: Normal heart sounds.   Pulmonary:      Effort: Pulmonary effort is normal.      Breath sounds: Normal breath sounds.   Abdominal:      General: Bowel sounds are normal.      Palpations: Abdomen is soft.   Musculoskeletal:         General: Normal range of motion.   Skin:     General: Skin is warm and dry.   Neurological:      Mental Status: She is alert and oriented to person, place, and time. Mental status is at baseline.   Psychiatric:         Mood and Affect: Mood normal.         Behavior: Behavior normal.         Thought Content: Thought content normal.         Judgment: Judgment normal.

## 2023-03-14 NOTE — PATIENT INSTRUCTIONS
Add flonase nasal spray daily to help with sinus congestion and oral antihistamine (claritan or zyrtec) daily to help with middle ear effusion.     Impetigo on external ear and right hand. Use bactroban ointment 3x per day for 5 days.       Patient Education     Understanding Impetigo  Impetigo is a common bacterial infection of the skin. It most often affects the face, arms, and legs. But it can appear on any part of the body. Anyone can have it, regardless of age. But it's most common in children. Impetigo is very contagious. This means it spreads easily to other people.    How to say it  [id number]  What causes impetigo?   Many types of bacteria live on normal, healthy skin. The bacteria usually don t cause problems. Impetigo happens when bacteria enter the skin through a scratch, break, sore, bite, or irritated spot. They then begin to grow out of control, leading to infection. The two most common bacteria causing impetigo are Staphylococcus and Streptococcus. In certain cases, impetigo appears on skin that has no visible break. It may be more likely to occur on skin that has another skin problem, such as eczema. It may also be more common after a cold or other virus.   Symptoms of impetigo   Symptoms of this problem include:    Small, fluid-filled blisters on the skin that may itch, ooze, or crust    A yellow, honey-colored crust on the infected skin    Skin sores that spread with scratching    An itchy rash that spreads with scratching    Swollen lymph nodes  Treatment for impetigo   The goal is to treat the infection and prevent it from spreading to others.    You will likely be given an antibiotic to treat the infection. This may be a cream or ointment to put on your skin. You usually need to use the cream or ointment for about 5 days. If the infection is severe or spreading, you may be given antibiotic medicine to take by mouth. Be sure to use this medicine as directed. Don't stop using it until you are  Rec'd fax from [facility] regarding admission    In [facility]t's [facility]   told to stop, even if your skin gets better. If you stop too soon, the infection may come back and be harder to treat.    Try not to scratch or pick at your sores. It may help to cover affected areas with a bandage.    To prevent spreading the infection, wash your hands often. Avoid sharing personal items, towels, clothes, pillows, and sheets with others. After each use, wash these items in hot water.    Clean the affected skin several times a day. Don t scrub. Instead, soak the area in warm, soapy water. This will help remove the crust that forms. For places that you can't soak, such as the face, place a clean, warm (not hot) washcloth on the affected area. Use a new washcloth and towel each time.  When to call your healthcare provider   Call your healthcare provider right away if you have any of these:    Fever of 100.4 F (38 C) or higher, or as directed by your healthcare provider    Increasing number of sores or spreading areas of redness after 2 days of treatment with antibiotics    Increasing swelling or pain    Increased amounts of fluid or pus coming from the sores    Unusual drowsiness, weakness, or change in behavior    Loss of appetite or vomiting  [employer] last reviewed this educational content on 6/1/2019 2000-2021 [employer]. All rights reserved. This information is not intended as a substitute for professional medical care. Always follow your healthcare professional's instructions.

## 2023-03-15 DIAGNOSIS — Z72.0 TOBACCO ABUSE: ICD-10-CM

## 2023-05-23 DIAGNOSIS — H10.33 ACUTE CONJUNCTIVITIS OF BOTH EYES, UNSPECIFIED ACUTE CONJUNCTIVITIS TYPE: Primary | ICD-10-CM

## 2023-05-23 RX ORDER — POLYMYXIN B SULFATE AND TRIMETHOPRIM 1; 10000 MG/ML; [USP'U]/ML
2 SOLUTION OPHTHALMIC 4 TIMES DAILY
Qty: 10 ML | Refills: 0 | Status: SHIPPED | OUTPATIENT
Start: 2023-05-23 | End: 2023-05-25

## 2023-05-25 ENCOUNTER — OFFICE VISIT (OUTPATIENT)
Dept: FAMILY MEDICINE | Facility: OTHER | Age: 38
End: 2023-05-25
Attending: NURSE PRACTITIONER
Payer: COMMERCIAL

## 2023-05-25 VITALS
TEMPERATURE: 98.7 F | HEART RATE: 67 BPM | HEIGHT: 64 IN | WEIGHT: 181 LBS | RESPIRATION RATE: 12 BRPM | DIASTOLIC BLOOD PRESSURE: 70 MMHG | SYSTOLIC BLOOD PRESSURE: 138 MMHG | OXYGEN SATURATION: 98 % | BODY MASS INDEX: 30.9 KG/M2

## 2023-05-25 DIAGNOSIS — H10.13 ALLERGIC CONJUNCTIVITIS, BILATERAL: Primary | ICD-10-CM

## 2023-05-25 DIAGNOSIS — H10.33 ACUTE BACTERIAL CONJUNCTIVITIS OF BOTH EYES: ICD-10-CM

## 2023-05-25 PROCEDURE — 99213 OFFICE O/P EST LOW 20 MIN: CPT | Performed by: NURSE PRACTITIONER

## 2023-05-25 RX ORDER — PREDNISONE 20 MG/1
40 TABLET ORAL DAILY
Qty: 6 TABLET | Refills: 0 | Status: SHIPPED | OUTPATIENT
Start: 2023-05-25 | End: 2023-05-28

## 2023-05-25 RX ORDER — OFLOXACIN 3 MG/ML
1-2 SOLUTION/ DROPS OPHTHALMIC 4 TIMES DAILY
Qty: 2 ML | Refills: 0 | Status: SHIPPED | OUTPATIENT
Start: 2023-05-25 | End: 2023-05-30

## 2023-05-25 RX ORDER — OLOPATADINE HYDROCHLORIDE 2 MG/ML
1 SOLUTION/ DROPS OPHTHALMIC DAILY
Qty: 0.5 ML | Refills: 0 | Status: SHIPPED | OUTPATIENT
Start: 2023-05-25 | End: 2023-06-04

## 2023-05-25 ASSESSMENT — PAIN SCALES - GENERAL: PAINLEVEL: MODERATE PAIN (5)

## 2023-05-25 NOTE — NURSING NOTE
Chief Complaint   Patient presents with     Eye Problem     Both eyes x 4 days     Patient being treated with the polymyxin drops x 3 days without relief.    Advanced Care Planning on file? no    Medication Review Completed: complete    FOOD SECURITY SCREENING QUESTIONS:    The next two questions are to help us understand your food security.  If you are feeling you need any assistance in this area, we have resources available to support you today.    Hunger Vital Signs:  Within the past 12 months we worried whether our food would run out before we got money to buy more. Never  Within the past 12 months the food we bought just didn't last and we didn't have money to get more. Never     EYE TEST  Patient does not wear corrective lenses  Right eye 10/10  Left eye 10/10  Both eyes 10/10    June A ROBIN Paniagua

## 2023-05-25 NOTE — PROGRESS NOTES
ASSESSMENT/PLAN:     I have reviewed the nursing notes.  I have reviewed the findings, diagnosis, plan and need for follow up with the patient.        1. Acute bacterial conjunctivitis of both eyes    - ofloxacin (OCUFLOX) 0.3 % ophthalmic solution; Place 1-2 drops into both eyes 4 times daily for 5 days  Dispense: 2 mL; Refill: 0    2. Allergic conjunctivitis, bilateral    - predniSONE (DELTASONE) 20 MG tablet; Take 2 tablets (40 mg) by mouth daily for 3 days  Dispense: 6 tablet; Refill: 0  - olopatadine (PATADAY) 0.2 % ophthalmic solution; Place 0.05 mLs (1 drop) into both eyes daily for 10 days  Dispense: 0.5 mL; Refill: 0    Discussed with patient that symptoms and exam are consistent with acute bacterial conjunctivitis with development of secondary allergy to the current antibiotic drops -polymyxin sulfate trimethoprim.  Discontinue current antibiotic drops -polymyxin sulfate trimethoprim and switch to ofloxacin along with Pataday and oral prednisone.    Symptomatic treatment -compresses to the eyes, saline lubricating drops, avoid touching or rubbing eyes, and hand hygiene, etc.    May use over-the-counter Tylenol PRN    Discussed warning signs/symptoms indicative of need to f/u  Follow up if symptoms persist or worsen or concerns      I explained my diagnostic considerations and recommendations to the patient, who voiced understanding and agreement with the treatment plan. All questions were answered. We discussed potential side effects of any prescribed or recommended therapies, as well as expectations for response to treatments.    Patsy Cruz NP  [facility] AND [facility]      SUBJECTIVE:   Alexa Abdalla is a 37 year old female who presents to clinic today for the following health issues:  Eyes    HPI  Bilateral eyes with green goopy drainage and burning for the past 4 days.  Left eye started first and right eye the following day.  She has been using Polymyxin drops for the past 3 days  without relief and has actually been feeling worse.  She is having light sensitivity today and burning sensation with increased redness and irritation.  This morning she woke up with her eyelids crusted shut.  She was able to show me pictures of the drainage and crusting which is thick yellow.  She does not wear contacts.  She has been wearing sunglasses today due to the irritation and sensitivity.  Vision is slightly blurry secondary to the drainage otherwise no noted vision change.      No past medical history on file.  Past Surgical History:   Procedure Laterality Date     OTHER SURGICAL HISTORY      75301.0,PAST SURGICAL HISTORY,None     OTHER SURGICAL HISTORY      2015,32547.0,[place]  DELIVERY [facility]UM [facility]     Social History     Tobacco Use     Smoking status: Every Day     Packs/day: 0.50     Types: Cigarettes     Start date: 2013     Smokeless tobacco: Never     Tobacco comments:     Quit smoking and started again after losing dog- is ready to try quitting again - using Chantix   Vaping Use     Vaping status: Never Used   Substance Use Topics     Alcohol use: Yes     Alcohol/week: 1.0 standard drink of alcohol     Types: 1 Cans of beer per week     Comment: Alcoholic Drinks/day: Socially     Current Outpatient Medications   Medication Sig Dispense Refill     citalopram (CELEXA) 20 MG tablet Take 1 tablet (20 mg) by mouth daily 90 tablet 4     traZODone (DESYREL) 50 MG tablet Take 2 tablets (100 mg) by mouth nightly as needed for sleep 180 tablet 4     trimethoprim-polymyxin b (POLYTRIM) 59390-3.1 UNIT/ML-% ophthalmic solution Place 2 drops Into the left eye 4 times daily for 7 days Use both eyes if needed 10 mL 0     acyclovir (ZOVIRAX) 800 MG tablet Take 1 tablet (800 mg) by mouth 2 times daily as needed (onset cold sores) (Patient not taking: Reported on 2023) 50 tablet 3     albuterol (PROAIR HFA/PROVENTIL HFA/VENTOLIN HFA) 108 (90 Base) MCG/ACT inhaler Inhale 2 puffs into the lungs  "every 6 hours as needed for shortness of breath / dyspnea or wheezing (cough) (Patient not taking: Reported on 5/25/2023) 18 g 0     valACYclovir (VALTREX) 1000 mg tablet 1 tab BID at onset of cold sore until resolved up to 5 days PRN (Patient not taking: Reported on 5/25/2023) 30 tablet 3     varenicline (CHANTIX BOWEN) 0.5 MG X 11 & 1 MG X 42 tablet Take 0.5 mg tab daily for 3 days, THEN 0.5 mg tab twice daily for 4 days, THEN 1 mg twice daily. (Patient not taking: Reported on 5/25/2023) 53 tablet 1     No Known Allergies      Past medical history, past surgical history, current medications and allergies reviewed and accurate to the best of my knowledge.        OBJECTIVE:     /70 (BP Location: Left arm, Patient Position: Sitting, Cuff Size: Adult Regular)   Pulse 67   Temp 98.7  F (37.1  C) (Tympanic)   Resp 12   Ht 1.626 m (5' 4\")   Wt 82.1 kg (181 lb)   LMP 05/04/2023   SpO2 98%   BMI 31.07 kg/m    Body mass index is 31.07 kg/m .     Physical Exam  General Appearance: Well groomed and non-ill-appearing adult female, appropriate appearance for age. No acute distress  Eyes: Bilateral bulbar and palpebral conjunctivae with diffuse bright erythema, irritation, and injection, corneas clear without noted subconjunctival hemorrhage, hypopyon, or hyphema, combination of watery and thick yellow drainage with crusting, no eyelid swelling or tenderness to palpation, pupils equal and reactive to light without noted sensitivity.  No eyeball pain with movement.  Orophayrnx: voice clear   Nose:  No noted drainage or congestion   Respiratory: normal chest wall and respirations.  Normal effort.  No cough appreciated.  Musculoskeletal:  Equal movement of bilateral upper extremities.  Equal movement of bilateral lower extremities.  Normal gait.   Dermatological: no facial rash  Psychological: normal affect, alert, oriented, and pleasant.         "

## 2023-06-04 ENCOUNTER — HEALTH MAINTENANCE LETTER (OUTPATIENT)
Age: 38
End: 2023-06-04

## 2023-06-21 DIAGNOSIS — H10.9 BACTERIAL CONJUNCTIVITIS OF LEFT EYE: ICD-10-CM

## 2023-06-21 RX ORDER — OFLOXACIN 3 MG/ML
SOLUTION/ DROPS OPHTHALMIC
Qty: 5 ML | Refills: 0 | Status: SHIPPED | OUTPATIENT
Start: 2023-06-21 | End: 2024-07-30

## 2023-07-31 DIAGNOSIS — F41.9 ANXIETY: ICD-10-CM

## 2023-07-31 DIAGNOSIS — F51.04 PSYCHOPHYSIOLOGICAL INSOMNIA: ICD-10-CM

## 2023-07-31 RX ORDER — CITALOPRAM HYDROBROMIDE 20 MG/1
20 TABLET ORAL DAILY
Qty: 90 TABLET | Refills: 0 | Status: SHIPPED | OUTPATIENT
Start: 2023-07-31 | End: 2024-07-30

## 2023-07-31 NOTE — TELEPHONE ENCOUNTER
Reason for call: Medication or medication refill    Veda was her primary and she is not sure where her prescription went when pharm sent it, but needs a refill    Name of medication requested: Citalopram    Are you out of the medication? yes    What [facility] you use[facility]    Preferred method for responding to this message: Telephone Call    Phone number patient can be reached at: Home number on file [phone] (home)    If we cannot reach you directly, may we leave a detailed response at the number you provided? Yes

## 2023-07-31 NOTE — TELEPHONE ENCOUNTER
Requested Prescriptions   Pending Prescriptions Disp Refills    citalopram (CELEXA) 20 MG tablet 90 tablet 4     Sig: Take 1 tablet (20 mg) by mouth daily   Last Prescription Date:   7/12/22  Last Fill Qty/Refills:         90, R-4    Last Office Visit:              11/14/22 ([facility])   Future Office visit:           None    Routing to covering provider for refill consideration, as PCP no longer at clinic.    Sandy Castro RN .............. 7/31/2023  1:21 PM

## 2023-12-11 DIAGNOSIS — F51.04 PSYCHOPHYSIOLOGICAL INSOMNIA: ICD-10-CM

## 2023-12-11 NOTE — TELEPHONE ENCOUNTER
Patient's last PCP was Nancy Mccollum. Has no follow up to [facility] with a new provider. Last filled for a 90 day supply on 9/11/2023.    Rekha Cabrales LPN on 12/11/2023 at 11:08 AM

## 2023-12-14 RX ORDER — TRAZODONE HYDROCHLORIDE 50 MG/1
100 TABLET, FILM COATED ORAL
Qty: 180 TABLET | Refills: 0 | Status: SHIPPED | OUTPATIENT
Start: 2023-12-14 | End: 2024-07-30

## 2023-12-14 NOTE — TELEPHONE ENCOUNTER
[facility] sent Rx request for the following:      Requested Prescriptions   Pending Prescriptions Disp Refills    traZODone (DESYREL) 50 MG tablet 180 tablet 4     Sig: Take 2 tablets (100 mg) by mouth nightly as needed for sleep       Serotonin Modulators Failed - 12/11/2023 11:08 AM   Last Prescription Date:   11/14/22  Last Fill Qty/Refills:         180, R-4    Last Office Visit:              11/14/22   Future Office visit:           none   Routing to covering provider for refill consideration, as PCP/provider is out of clinic >48 hours or Pt is completely out of medication and provider is out of the clinic today.  Alicia Pedroza RN on 12/14/2023 at 10:53 AM

## 2023-12-14 NOTE — TELEPHONE ENCOUNTER
Due for wellness visit- please schedule patient with any provider- ok for family practice before future refills. Short prescription sent

## 2023-12-14 NOTE — TELEPHONE ENCOUNTER
Spoke with patient. Informed her of ANTWAN Hall NP response. Patient will call at another time to make a wellness visit appointment.    Veronica Johnson RN on 12/14/2023 at 11:51 AM

## 2024-06-13 DIAGNOSIS — Z86.19 HISTORY OF COLD SORES: ICD-10-CM

## 2024-06-19 RX ORDER — VALACYCLOVIR HYDROCHLORIDE 1 G/1
TABLET, FILM COATED ORAL
Qty: 30 TABLET | Refills: 3 | Status: SHIPPED | OUTPATIENT
Start: 2024-06-19 | End: 2024-07-30

## 2024-06-19 NOTE — TELEPHONE ENCOUNTER
[facility] sent Rx request for the following:      Requested Prescriptions   Pending Prescriptions Disp Refills    valACYclovir (VALTREX) 1000 mg tablet [Pharmacy Med Name: VALACYCLOVIR 1GM TABLETS] 30 tablet 3     Sig: TAKE 2 TABLETS BY MOUTH AT ONSET OF COLD SORE AND 2 TABLETS 12 HOURS LATER       Antivirals Failed - 6/19/2024  9:39 AM        Failed - Recent (12 mo) or future (90 days) visit within the authorizing provider's specialty     The patient must have completed an in-person or virtual visit within the past 12 months or has a future visit scheduled within the next 90 days with the authorizing provider s specialty.  Urgent care and e-visits do not quality as an office visit for this protocol.          Failed - Medication indicated for associated diagnosis     Medication is associated with one or more of the following diagnoses:     Genital herpes simplex   Herpes simples   Herpes zoster, shingles   Varicella   Recurrent herpes simplex labialis   HIV infection   Varicella-zoster virus infection, prophylaxis         Last Prescription Date:   3/22/22  Last Fill Qty/Refills:         30, R-3    Last Office Visit:              11/14/22 [facility]   Future Office visit:           none      Routing refill request to provider for review/approval because:  Patient needs to be seen because it has been more than 1 year since last office visit.    Veronica Johnson RN on 6/19/2024 at 9:41 AM

## 2024-07-13 ENCOUNTER — HEALTH MAINTENANCE LETTER (OUTPATIENT)
Age: 39
End: 2024-07-13

## 2024-07-30 ENCOUNTER — OFFICE VISIT (OUTPATIENT)
Dept: FAMILY MEDICINE | Facility: OTHER | Age: 39
End: 2024-07-30
Attending: NURSE PRACTITIONER
Payer: COMMERCIAL

## 2024-07-30 VITALS
RESPIRATION RATE: 16 BRPM | SYSTOLIC BLOOD PRESSURE: 124 MMHG | HEIGHT: 64 IN | OXYGEN SATURATION: 97 % | BODY MASS INDEX: 29.19 KG/M2 | WEIGHT: 171 LBS | DIASTOLIC BLOOD PRESSURE: 78 MMHG | HEART RATE: 72 BPM

## 2024-07-30 DIAGNOSIS — Z87.891 PERSONAL HISTORY OF TOBACCO USE, PRESENTING HAZARDS TO HEALTH: ICD-10-CM

## 2024-07-30 DIAGNOSIS — F41.9 ANXIETY: ICD-10-CM

## 2024-07-30 DIAGNOSIS — Z00.00 ROUTINE GENERAL MEDICAL EXAMINATION AT A HEALTH CARE FACILITY: Primary | ICD-10-CM

## 2024-07-30 DIAGNOSIS — L72.3 SEBACEOUS CYST: ICD-10-CM

## 2024-07-30 DIAGNOSIS — F17.210 CIGARETTE NICOTINE DEPENDENCE WITHOUT COMPLICATION: ICD-10-CM

## 2024-07-30 DIAGNOSIS — Z13.220 LIPID SCREENING: ICD-10-CM

## 2024-07-30 DIAGNOSIS — Z82.49 FH: BRAIN ANEURYSM: ICD-10-CM

## 2024-07-30 DIAGNOSIS — B00.9 HERPES SIMPLEX VIRUS (HSV) INFECTION: ICD-10-CM

## 2024-07-30 DIAGNOSIS — F51.04 PSYCHOPHYSIOLOGICAL INSOMNIA: ICD-10-CM

## 2024-07-30 DIAGNOSIS — Z12.4 CERVICAL CANCER SCREENING: ICD-10-CM

## 2024-07-30 DIAGNOSIS — Z12.31 ENCOUNTER FOR SCREENING MAMMOGRAM FOR BREAST CANCER: ICD-10-CM

## 2024-07-30 LAB
ALBUMIN SERPL BCG-MCNC: 4.7 G/DL (ref 3.5–5.2)
ALP SERPL-CCNC: 54 U/L (ref 40–150)
ALT SERPL W P-5'-P-CCNC: 16 U/L (ref 0–50)
ANION GAP SERPL CALCULATED.3IONS-SCNC: 8 MMOL/L (ref 7–15)
AST SERPL W P-5'-P-CCNC: 21 U/L (ref 0–45)
BILIRUB SERPL-MCNC: 0.6 MG/DL
BUN SERPL-MCNC: 9.2 MG/DL (ref 6–20)
CALCIUM SERPL-MCNC: 9.9 MG/DL (ref 8.8–10.4)
CHLORIDE SERPL-SCNC: 103 MMOL/L (ref 98–107)
CHOLEST SERPL-MCNC: 216 MG/DL
CREAT SERPL-MCNC: 0.81 MG/DL (ref 0.51–0.95)
EGFRCR SERPLBLD CKD-EPI 2021: >90 ML/MIN/1.73M2
FASTING STATUS PATIENT QL REPORTED: YES
FASTING STATUS PATIENT QL REPORTED: YES
GLUCOSE SERPL-MCNC: 100 MG/DL (ref 70–99)
HCO3 SERPL-SCNC: 27 MMOL/L (ref 22–29)
HDLC SERPL-MCNC: 60 MG/DL
LDLC SERPL CALC-MCNC: 136 MG/DL
NONHDLC SERPL-MCNC: 156 MG/DL
POTASSIUM SERPL-SCNC: 4.8 MMOL/L (ref 3.4–5.3)
PROT SERPL-MCNC: 7.7 G/DL (ref 6.4–8.3)
SODIUM SERPL-SCNC: 138 MMOL/L (ref 135–145)
TRIGL SERPL-MCNC: 98 MG/DL

## 2024-07-30 PROCEDURE — 80061 LIPID PANEL: CPT | Mod: ZL | Performed by: NURSE PRACTITIONER

## 2024-07-30 PROCEDURE — 99213 OFFICE O/P EST LOW 20 MIN: CPT | Mod: 25 | Performed by: NURSE PRACTITIONER

## 2024-07-30 PROCEDURE — 90471 IMMUNIZATION ADMIN: CPT | Performed by: NURSE PRACTITIONER

## 2024-07-30 PROCEDURE — G0123 SCREEN CERV/VAG THIN LAYER: HCPCS | Performed by: NURSE PRACTITIONER

## 2024-07-30 PROCEDURE — 99396 PREV VISIT EST AGE 40-64: CPT | Mod: 25 | Performed by: NURSE PRACTITIONER

## 2024-07-30 PROCEDURE — 36415 COLL VENOUS BLD VENIPUNCTURE: CPT | Mod: ZL | Performed by: NURSE PRACTITIONER

## 2024-07-30 PROCEDURE — 87624 HPV HI-RISK TYP POOLED RSLT: CPT | Mod: ZL | Performed by: NURSE PRACTITIONER

## 2024-07-30 PROCEDURE — 90677 PCV20 VACCINE IM: CPT | Performed by: NURSE PRACTITIONER

## 2024-07-30 PROCEDURE — 80053 COMPREHEN METABOLIC PANEL: CPT | Mod: ZL | Performed by: NURSE PRACTITIONER

## 2024-07-30 RX ORDER — CITALOPRAM HYDROBROMIDE 20 MG/1
20 TABLET ORAL DAILY
Qty: 90 TABLET | Refills: 4 | Status: SHIPPED | OUTPATIENT
Start: 2024-07-30

## 2024-07-30 RX ORDER — ACYCLOVIR 800 MG/1
800 TABLET ORAL 2 TIMES DAILY PRN
Qty: 50 TABLET | Refills: 3 | Status: SHIPPED | OUTPATIENT
Start: 2024-07-30

## 2024-07-30 RX ORDER — TRAZODONE HYDROCHLORIDE 50 MG/1
100 TABLET, FILM COATED ORAL
Qty: 180 TABLET | Refills: 4 | Status: SHIPPED | OUTPATIENT
Start: 2024-07-30

## 2024-07-30 SDOH — HEALTH STABILITY: PHYSICAL HEALTH: ON AVERAGE, HOW MANY DAYS PER WEEK DO YOU ENGAGE IN MODERATE TO STRENUOUS EXERCISE (LIKE A BRISK WALK)?: 5 DAYS

## 2024-07-30 ASSESSMENT — SOCIAL DETERMINANTS OF HEALTH (SDOH): HOW OFTEN DO YOU GET TOGETHER WITH FRIENDS OR RELATIVES?: MORE THAN THREE TIMES A WEEK

## 2024-07-30 NOTE — NURSING NOTE
Patient presents today for annual physical.    Medication Reconciliation Complete    Alexa Bowen LPN  7/30/2024 9:01 AM

## 2024-07-30 NOTE — PROGRESS NOTES
Preventive Care Visit  [facility] AND [facility]  DAX Moreira CNP, Nurse Practitioner - Family  Jul 30, 2024      Assessment & Plan   Problem List Items Addressed This Visit          Infectious/Inflammatory    Herpes simplex virus (HSV) infection    Relevant Medications    acyclovir (ZOVIRAX) 800 MG tablet     Other Visit Diagnoses       Routine general medical examination at a health care facility    -  Primary    Relevant Orders    Comprehensive Metabolic Panel (Completed)    Anxiety        Relevant Medications    citalopram (CELEXA) 20 MG tablet    traZODone (DESYREL) 50 MG tablet    Psychophysiological insomnia        Relevant Medications    citalopram (CELEXA) 20 MG tablet    traZODone (DESYREL) 50 MG tablet    Cervical cancer screening        Relevant Orders    Pap Screen with HPV - Recommended Age 30 - 65 Years    Gynecologic Cytology (PAP)    Lipid screening        Relevant Orders    Lipid Panel (Completed)    Encounter for screening mammogram for breast cancer        Relevant Orders    MA Screen Bilateral w/Yevgeniy    Personal history of tobacco use, presenting hazards to health        Relevant Orders    PNEUMOCOCCAL 20 VALENT CONJUGATE (PREVNAR 20) (Completed)    FH: brain aneurysm        Relevant Orders    MRA Brain ([place]) wo Contrast    Sebaceous cyst        Cigarette nicotine dependence without complication                 History of herpes, order for acyclovir to have on hand to use as needed, no current infection.  CMP and lipid panel updated and stable  Refilled citalopram and trazodone for management of insomnia and anxiety, these are managing symptoms well  Cervical cancer screening completed, will follow-up with Pap smear results when available  Mammogram ordered  Pneumonia vaccine updated today due to tobacco use, discussed smoking cessation  Family history of brain aneurysm, brain MRI ordered  Sebaceous cyst labia, reassurance provided  Discussed other immunizations  "complete in the fall  Follow-up annually and as needed       Nicotine/Tobacco Cessation  She reports that she has been smoking cigarettes. She started smoking about 10 years ago. She has a 5.4 pack-year smoking history. She has never used smokeless tobacco.  Nicotine/Tobacco Cessation Plan  Self help information given to patient      BMI  Estimated body mass index is 29.35 kg/m  as calculated from the following:    Height as of this encounter: 1.626 m (5' 4\").    Weight as of this encounter: 77.6 kg (171 lb).   Weight management plan: Discussed healthy diet and exercise guidelines    Counseling  Appropriate preventive services were addressed with this patient via screening, questionnaire, or discussion as appropriate for fall prevention, nutrition, physical activity, Tobacco-use cessation, weight loss and cognition.  Checklist reviewing preventive services available has been given to the patient.  Reviewed patient's diet, addressing concerns and/or questions.         Return in about 53 weeks (around 8/5/2025) for Annual Wellness Visit.      Josh Liu is a 39 year old, presenting for the following:  Physical         Health Care Directive  Patient does not have a Health Care Directive or Living Will:     HPI    She presents for annual physical exam.  She is due for Pap smear.  Reports mom with 2 brain aneurysms, recommended that she have imaging.  Continues to smoke, Chantix worked the first time she tried to quit, not the second.  She does have patches.  She is wanting to quit smoking but reports increased anxiety and worry about weight gain.  Treating anxiety with citalopram which has been helpful.  She is concerned about possible cyst on her labia.  Does have Mirena in place, placed 2019.        7/30/2024   General Health   How would you rate your overall physical health? Good   Feel stress (tense, anxious, or unable to sleep) Rather much      (!) STRESS CONCERN      7/30/2024   Nutrition   Three or more " servings of calcium each day? Yes   Diet: Regular (no restrictions)   How many servings of fruit and vegetables per day? (!) 0-1   How many sweetened beverages each day? (!) 2            7/30/2024   Exercise   Days per week of moderate/strenous exercise 5 days            7/30/2024   Social Factors   Frequency of gathering with friends or relatives More than three times a week   Worry food won't last until get money to buy more No   Food not last or not have enough money for food? No   Do you have housing? (Housing is defined as stable permanent housing and does not include staying ouside in a car, in a tent, in an abandoned building, in an overnight shelter, or couch-surfing.) Yes   Are you worried about losing your housing? No   Lack of transportation? No   Unable to get utilities (heat,electricity)? No            7/30/2024   Dental   Dentist two times every year? Yes            7/30/2024   TB Screening   Were you born outside of the [place]? No            Today's PHQ-2 Score:       7/30/2024     8:53 AM   PHQ-2 ( 1999 [employer])   Q1: Little interest or pleasure in doing things 0   Q2: Feeling down, depressed or hopeless 0   PHQ-2 Score 0   Q1: Little interest or pleasure in doing things Not at all   Q2: Feeling down, depressed or hopeless Not at all   PHQ-2 Score 0           7/30/2024   Substance Use   Alcohol more than 3/day or more than 7/wk No   Do you use any other substances recreationally? No        Social History     Tobacco Use    Smoking status: Every Day     Current packs/day: 0.50     Average packs/day: 0.5 packs/day for 10.7 years (5.4 ttl pk-yrs)     Types: Cigarettes     Start date: 11/2/2013    Smokeless tobacco: Never    Tobacco comments:     Quit smoking and started again after losing dog- is ready to try quitting again - using Chantix   Vaping Use    Vaping status: Never Used   Substance Use Topics    Alcohol use: Yes     Alcohol/week: 1.0 standard drink of alcohol     Types: 1 Cans of beer per week      Comment: Alcoholic Drinks/day: Socially    Drug use: No     Comment: Drug use: No          Mammogram Screening - Mammogram every 1-2 years updated in Health Maintenance based on mutual decision making        2024   STI Screening   New sexual partner(s) since last STI/HIV test? No        History of abnormal Pap smear: No - age 30- 64 PAP with HPV every 5 years recommended       ASCVD Risk   The 10-year ASCVD risk score (Paulette FLOREZ, et al., 2019) is: 2.2%    Values used to calculate the score:      Age: 39 years      Sex: Female      Is Non- : No      Diabetic: No      Tobacco smoker: Yes      Systolic Blood Pressure: 124 mmHg      Is BP treated: No      HDL Cholesterol: 60 mg/dL      Total Cholesterol: 216 mg/dL        2024   Contraception/Family Planning   Questions about contraception or family planning (!) YES            Reviewed and updated as needed this visit by Provider   Tobacco  Allergies  Meds  Problems  Med Hx  Surg Hx  Fam Hx            History reviewed. No pertinent past medical history.  Past Surgical History:   Procedure Laterality Date    OTHER SURGICAL HISTORY      72217.0,PAST SURGICAL HISTORY,None    OTHER SURGICAL HISTORY      [place],11380.[place]  [facility] [facility]     Patient Active Problem List   Diagnosis    Herpes simplex virus (HSV) infection    Dizzy spells     Past Surgical History:   Procedure Laterality Date    OTHER SURGICAL HISTORY      63630.0,PAST SURGICAL HISTORY,None    OTHER SURGICAL HISTORY      2015,25864.0,[place]  [facility]ERY [facility]       Social History     Tobacco Use    Smoking status: Every Day     Current packs/day: 0.50     Average packs/day: 0.5 packs/day for 10.7 years (5.4 ttl pk-yrs)     Types: Cigarettes     Start date: 2013    Smokeless tobacco: Never    Tobacco comments:     Quit smoking and started again after losing dog- is ready to try quitting again - using Chantix  "  Substance Use Topics    Alcohol use: Yes     Alcohol/week: 1.0 standard drink of alcohol     Types: 1 Cans of beer per week     Comment: Alcoholic Drinks/day: Socially     Family History   Problem Relation Age of Onset    Breast Cancer Maternal Grandmother     Breast Cancer Paternal Grandmother 60    Genetic Disorder Other         Genetic,Positive for HTN    Alcoholism No family hx of         Alcoholism         Current Outpatient Medications   Medication Sig Dispense Refill    acyclovir (ZOVIRAX) 800 MG tablet Take 1 tablet (800 mg) by mouth 2 times daily as needed (onset cold sores) 50 tablet 3    citalopram (CELEXA) 20 MG tablet Take 1 tablet (20 mg) by mouth daily 90 tablet 4    traZODone (DESYREL) 50 MG tablet Take 2 tablets (100 mg) by mouth nightly as needed for sleep 180 tablet 4     Allergies   Allergen Reactions    Polymyxin B Visual Disturbance     Severe burning and redness in eyes.            Objective    Exam  /78   Pulse 72   Resp 16   Ht 1.626 m (5' 4\")   Wt 77.6 kg (171 lb)   SpO2 97%   BMI 29.35 kg/m     Estimated body mass index is 29.35 kg/m  as calculated from the following:    Height as of this encounter: 1.626 m (5' 4\").    Weight as of this encounter: 77.6 kg (171 lb).    Physical Exam  GENERAL: alert and no distress  EYES: Eyes grossly normal to inspection, PERRL and conjunctivae and sclerae normal  HENT: ear canals and TM's normal, nose and mouth without ulcers or lesions  NECK: no adenopathy, no asymmetry, masses, or scars  RESP: lungs clear to auscultation - no rales, rhonchi or wheezes  CV: regular rate and rhythm, normal S1 S2, no S3 or S4, no murmur, click or rub, no peripheral edema  ABDOMEN: soft, nontender, no hepatosplenomegaly, no masses and bowel sounds normal   (female) w/bimanual: normal female external genitalia-does have several with raised bumps consistent with sebaceous cysts, normal urethral meatus, normal vaginal mucosa, and normal cervix/adnexa/uterus " without masses or discharge. Pap with HPV obtained, unable to appreciate IUD stings  MS: no gross musculoskeletal defects noted, no edema  SKIN: no suspicious lesions or rashes  NEURO: Normal strength and tone, mentation intact and speech normal  PSYCH: mentation appears normal, affect normal/bright        Signed Electronically by: DAX Moreira CNP

## 2024-07-30 NOTE — PATIENT INSTRUCTIONS
Patient Education   Preventive Care Advice   This is general advice given by our system to help you stay healthy. However, your care team may have specific advice just for you. Please talk to your care team about your preventive care needs.  Nutrition  Eat 5 or more servings of fruits and vegetables each day.  Try wheat bread, brown rice and whole grain pasta (instead of white bread, rice, and pasta).  Get enough calcium and vitamin D. Check the label on foods and aim for 100% of the RDA (recommended daily allowance).  Lifestyle  Exercise at least 150 minutes each week  (30 minutes a day, 5 days a week).  Do muscle strengthening activities 2 days a week. These help control your weight and prevent disease.  No smoking.  Wear sunscreen to prevent skin cancer.  Have a dental exam and cleaning every 6 months.  Yearly exams  See your health care team every year to talk about:  Any changes in your health.  Any medicines your care team has prescribed.  Preventive care, family planning, and ways to prevent chronic diseases.  Shots (vaccines)   HPV shots (up to age 26), if you've never had them before.  Hepatitis B shots (up to age 59), if you've never had them before.  COVID-19 shot: Get this shot when it's due.  Flu shot: Get a flu shot every year.  Tetanus shot: Get a tetanus shot every 10 years.  Pneumococcal, hepatitis A, and RSV shots: Ask your care team if you need these based on your risk.  Shingles shot (for age 50 and up)  General health tests  Diabetes screening:  Starting at age 35, Get screened for diabetes at least every 3 years.  If you are younger than age 35, ask your care team if you should be screened for diabetes.  Cholesterol test: At age 39, start having a cholesterol test every 5 years, or more often if advised.  Bone density scan (DEXA): At age 50, ask your care team if you should have this scan for osteoporosis (brittle bones).  Hepatitis C: Get tested at least once in your life.  STIs (sexually  transmitted infections)  Before age 24: Ask your care team if you should be screened for STIs.  After age 24: Get screened for STIs if you're at risk. You are at risk for STIs (including HIV) if:  You are sexually active with more than one person.  You don't use condoms every time.  You or a partner was diagnosed with a sexually transmitted infection.  If you are at risk for HIV, ask about PrEP medicine to prevent HIV.  Get tested for HIV at least once in your life, whether you are at risk for HIV or not.  Cancer screening tests  Cervical cancer screening: If you have a cervix, begin getting regular cervical cancer screening tests starting at age 21.  Breast cancer scan (mammogram): If you've ever had breasts, begin having regular mammograms starting at age 40. This is a scan to check for breast cancer.  Colon cancer screening: It is important to start screening for colon cancer at age 45.  Have a colonoscopy test every 10 years (or more often if you're at risk) Or, ask your provider about stool tests like a FIT test every year or Cologuard test every 3 years.  To learn more about your testing options, visit:   .  For help making a decision, visit:   https://bit.ly/sk74723.  Prostate cancer screening test: If you have a prostate, ask your care team if a prostate cancer screening test (PSA) at age 55 is right for you.  Lung cancer screening: If you are a current or former smoker ages 50 to 80, ask your care team if ongoing lung cancer screenings are right for you.  For informational purposes only. Not to replace the advice of your health care provider. Copyright   2023 [facility]. All rights reserved. Clinically reviewed by the [facility] Transitions Program. [employer] [id number] - REV 01/24.  Learning About Stress  What is stress?     Stress is your body's response to a hard situation. Your body can have a physical, emotional, or mental response. Stress is a fact of life for most people, and it  affects everyone differently. What causes stress for you may not be stressful for someone else.  A lot of things can cause stress. You may feel stress when you go on a job interview, take a test, or run a race. This kind of short-term stress is normal and even useful. It can help you if you need to work hard or react quickly. For example, stress can help you finish an important job on time.  Long-term stress is caused by ongoing stressful situations or events. Examples of long-term stress include long-term health problems, ongoing problems at work, or conflicts in your family. Long-term stress can harm your health.  How does stress affect your health?  When you are stressed, your body responds as though you are in danger. It makes hormones that speed up your heart, make you breathe faster, and give you a burst of energy. This is called the fight-or-flight stress response. If the stress is over quickly, your body goes back to normal and no harm is done.  But if stress happens too often or lasts too long, it can have bad effects. Long-term stress can make you more likely to get sick, and it can make symptoms of some diseases worse. If you tense up when you are stressed, you may develop neck, shoulder, or low back pain. Stress is linked to high blood pressure and heart disease.  Stress also harms your emotional health. It can make you gale, tense, or depressed. Your relationships may suffer, and you may not do well at work or school.  What can you do to manage stress?  You can try these things to help manage stress:   Do something active. Exercise or activity can help reduce stress. Walking is a great way to get started. Even everyday activities such as housecleaning or yard work can help.  Try yoga or alessio chi. These techniques combine exercise and meditation. You may need some training at first to learn them.  Do something you enjoy. For example, listen to music or go to a movie. Practice your hobby or do volunteer  "work.  Meditate. This can help you relax, because you are not worrying about what happened before or what may happen in the future.  Do guided imagery. Imagine yourself in any setting that helps you feel calm. You can use online videos, books, or a teacher to guide you.  Do breathing exercises. For example:  From a standing position, bend forward from the waist with your knees slightly bent. Let your arms dangle close to the floor.  Breathe in slowly and deeply as you return to a standing position. Roll up slowly and lift your head last.  Hold your breath for just a few seconds in the standing position.  Breathe out slowly and bend forward from the waist.  Let your feelings out. Talk, laugh, cry, and express anger when you need to. Talking with supportive friends or family, a counselor, or a law leader about your feelings is a healthy way to relieve stress. Avoid discussing your feelings with people who make you feel worse.  Write. It may help to write about things that are bothering you. This helps you find out how much stress you feel and what is causing it. When you know this, you can find better ways to cope.  What can you do to prevent stress?  You might try some of these things to help prevent stress:  Manage your time. This helps you find time to do the things you want and need to do.  Get enough sleep. Your body recovers from the stresses of the day while you are sleeping.  Get support. Your family, friends, and community can make a difference in how you experience stress.  Limit your news feed. Avoid or limit time on social media or news that may make you feel stressed.  Do something active. Exercise or activity can help reduce stress. Walking is a great way to get started.  Where can you learn more?  Go to https://www.healthwise.net/patiented  Enter N032 in the search box to learn more about \"Learning About Stress.\"  Current as of: October 24, 2023               Content Version: 14.0    [id number] " [employer], [facility].   Care instructions adapted under license by your healthcare professional. If you have questions about a medical condition or this instruction, always ask your healthcare professional. [employer] disclaims any warranty or liability for your use of this information.

## 2024-08-01 ENCOUNTER — HOSPITAL ENCOUNTER (OUTPATIENT)
Dept: MRI IMAGING | Facility: OTHER | Age: 39
Discharge: HOME | End: 2024-08-01
Attending: NURSE PRACTITIONER | Admitting: NURSE PRACTITIONER
Payer: COMMERCIAL

## 2024-08-01 DIAGNOSIS — Z82.49 FH: BRAIN ANEURYSM: ICD-10-CM

## 2024-08-01 PROCEDURE — 70544 MR ANGIOGRAPHY HEAD W/O DYE: CPT

## 2024-08-06 LAB
HPV HR 12 DNA CVX QL NAA+PROBE: NEGATIVE
HPV16 DNA CVX QL NAA+PROBE: NEGATIVE
HPV18 DNA CVX QL NAA+PROBE: NEGATIVE
HUMAN PAPILLOMA VIRUS FINAL DIAGNOSIS: NORMAL

## 2024-08-08 LAB
BKR LAB AP GYN ADEQUACY: NORMAL
BKR LAB AP GYN INTERPRETATION: NORMAL
BKR LAB AP PREVIOUS ABNORMAL: NORMAL
PATH REPORT.COMMENTS IMP SPEC: NORMAL
PATH REPORT.COMMENTS IMP SPEC: NORMAL
PATH REPORT.RELEVANT HX SPEC: NORMAL

## 2024-09-05 ENCOUNTER — HOSPITAL ENCOUNTER (OUTPATIENT)
Dept: MAMMOGRAPHY | Facility: OTHER | Age: 39
Discharge: HOME | End: 2024-09-05
Attending: NURSE PRACTITIONER | Admitting: NURSE PRACTITIONER
Payer: COMMERCIAL

## 2024-09-05 DIAGNOSIS — Z12.31 ENCOUNTER FOR SCREENING MAMMOGRAM FOR BREAST CANCER: ICD-10-CM

## 2024-09-05 PROCEDURE — 77063 BREAST TOMOSYNTHESIS BI: CPT

## 2024-09-18 ENCOUNTER — HOSPITAL ENCOUNTER (OUTPATIENT)
Dept: GENERAL RADIOLOGY | Facility: OTHER | Age: 39
Discharge: HOME | End: 2024-09-18
Payer: COMMERCIAL

## 2024-09-18 ENCOUNTER — HOSPITAL ENCOUNTER (OUTPATIENT)
Dept: MAMMOGRAPHY | Facility: OTHER | Age: 39
Discharge: HOME | End: 2024-09-18
Attending: NURSE PRACTITIONER
Payer: COMMERCIAL

## 2024-09-18 ENCOUNTER — HOSPITAL ENCOUNTER (OUTPATIENT)
Dept: ULTRASOUND IMAGING | Facility: OTHER | Age: 39
Discharge: HOME | End: 2024-09-18
Attending: NURSE PRACTITIONER
Payer: COMMERCIAL

## 2024-09-18 ENCOUNTER — OFFICE VISIT (OUTPATIENT)
Dept: FAMILY MEDICINE | Facility: OTHER | Age: 39
End: 2024-09-18
Attending: FAMILY MEDICINE
Payer: COMMERCIAL

## 2024-09-18 VITALS
DIASTOLIC BLOOD PRESSURE: 80 MMHG | HEIGHT: 64 IN | SYSTOLIC BLOOD PRESSURE: 124 MMHG | WEIGHT: 177.5 LBS | OXYGEN SATURATION: 98 % | RESPIRATION RATE: 12 BRPM | TEMPERATURE: 98 F | HEART RATE: 57 BPM | BODY MASS INDEX: 30.3 KG/M2

## 2024-09-18 DIAGNOSIS — R25.3 MUSCLE TWITCHING: ICD-10-CM

## 2024-09-18 DIAGNOSIS — M43.12 ANTEROLISTHESIS OF CERVICAL SPINE: Primary | ICD-10-CM

## 2024-09-18 DIAGNOSIS — M25.511 ACUTE PAIN OF RIGHT SHOULDER: ICD-10-CM

## 2024-09-18 DIAGNOSIS — R92.8 ABNORMAL FINDING ON BREAST IMAGING: ICD-10-CM

## 2024-09-18 DIAGNOSIS — M47.819 FACET ARTHROPATHY, MULTILEVEL: ICD-10-CM

## 2024-09-18 DIAGNOSIS — M54.2 NECK PAIN ON RIGHT SIDE: ICD-10-CM

## 2024-09-18 PROCEDURE — 76642 ULTRASOUND BREAST LIMITED: CPT | Mod: LT

## 2024-09-18 PROCEDURE — 72040 X-RAY EXAM NECK SPINE 2-3 VW: CPT

## 2024-09-18 PROCEDURE — 72070 X-RAY EXAM THORAC SPINE 2VWS: CPT

## 2024-09-18 PROCEDURE — 99214 OFFICE O/P EST MOD 30 MIN: CPT

## 2024-09-18 PROCEDURE — 77061 BREAST TOMOSYNTHESIS UNI: CPT | Mod: LT

## 2024-09-18 RX ORDER — PREDNISONE 20 MG/1
TABLET ORAL
Qty: 20 TABLET | Refills: 0 | Status: SHIPPED | OUTPATIENT
Start: 2024-09-18

## 2024-09-18 RX ORDER — CYCLOBENZAPRINE HCL 10 MG
10 TABLET ORAL 3 TIMES DAILY PRN
Qty: 42 TABLET | Refills: 0 | Status: SHIPPED | OUTPATIENT
Start: 2024-09-18 | End: 2024-10-02

## 2024-09-18 ASSESSMENT — PAIN SCALES - GENERAL: PAINLEVEL: MILD PAIN (3)

## 2024-09-18 NOTE — PROGRESS NOTES
ASSESSMENT/PLAN:    I have reviewed the nursing notes.  I have reviewed the findings, diagnosis, plan and need for follow up with the patient.    1. Neck pain on right side  2. Acute pain of right shoulder  3. Muscle twitching    - XR Cervical Spine 2/3 Views- there is grade 1 anterolisthesis of C4 on C5, likely degenerative.  There is reversal of the typical cervical lordosis centered at C5.  No acute fracture.  There is multilevel disc space narrowing and multilevel facet arthropathy.  Prevertebral soft tissues are normal in caliber and contour.  Multilevel degenerative changes of the cervical spine per radiologist.    - XR Thoracic Spine 2 Views- no acute fracture or subluxation.  Normal alignment.  No significant degenerative changes.  Paraspinous soft tissues are unremarkable per radiologist.    4. Facet arthropathy, multilevel  5. Anterolisthesis of cervical spine    - predniSONE (DELTASONE) 20 MG tablet; Take 3 tabs by mouth daily x 3 days, then 2 tabs daily x 3 days, then 1 tab daily x 3 days, then 1/2 tab daily x 3 days.  Dispense: 20 tablet; Refill: 0    - cyclobenzaprine (FLEXERIL) 10 MG tablet; Take 1 tablet (10 mg) by mouth 3 times daily as needed for muscle spasms.  Dispense: 42 tablet; Refill: 0    - Spine  Referral; Future    - May use over-the-counter Tylenol and ibuprofen as needed for pain, inflammation or fever    - Discussed warning signs/symptoms indicative of need to f/u    - Follow up if symptoms persist or worsen or concerns    - I explained my diagnostic considerations and recommendations to the patient, who voiced understanding and agreement with the treatment plan. All questions were answered. We discussed potential side effects of any prescribed or recommended therapies, as well as expectations for response to treatments.    Daily Howe, DAX CNP  9/18/2024  10:47 AM    HPI:    Alexa Abdalla is a 39 year old female who presents to [facility] today for concerns of shoulder  and neck pain since 2024.  Patient denies any trauma or injury.  Patient states that she woke up and her neck was stiff and having pain that radiated down the right side of her neck into her shoulder causing muscle spasms and twitching in her right arm.  Patient states that she has been to the chiropractor a couple of times and has had a 90-minute massage with no relief.  Patient states that she has also applied hot and cold compresses and had taken one of her husbands  muscle relaxants which she states did not help at all.  Denies any tingling or numbness or loss of sensation.  Patient denies fever, chills, shortness of breath, chest pain, nausea, vomiting, diarrhea, constipation, headache, lightheadedness, dizziness, otalgia, cold symptoms or rash.    History reviewed. No pertinent past medical history.  Past Surgical History:   Procedure Laterality Date    OTHER SURGICAL HISTORY      06778.0,PAST SURGICAL HISTORY,None    OTHER SURGICAL HISTORY      2015,11827.0,NE  DELIVERY W POSTPARTUM CARE     Social History     Tobacco Use    Smoking status: Every Day     Current packs/day: 0.50     Average packs/day: 0.5 packs/day for 10.9 years (5.4 ttl pk-yrs)     Types: Cigarettes     Start date: 2013    Smokeless tobacco: Never    Tobacco comments:     Quit smoking and started again after losing dog- is ready to try quitting again - using Chantix   Substance Use Topics    Alcohol use: Yes     Alcohol/week: 1.0 standard drink of alcohol     Types: 1 Cans of beer per week     Comment: Alcoholic Drinks/day: Socially     Current Outpatient Medications   Medication Sig Dispense Refill    acyclovir (ZOVIRAX) 800 MG tablet Take 1 tablet (800 mg) by mouth 2 times daily as needed (onset cold sores) 50 tablet 3    citalopram (CELEXA) 20 MG tablet Take 1 tablet (20 mg) by mouth daily 90 tablet 4    cyclobenzaprine (FLEXERIL) 10 MG tablet Take 1 tablet (10 mg) by mouth 3 times daily as needed for muscle  "spasms. 42 tablet 0    predniSONE (DELTASONE) 20 MG tablet Take 3 tabs by mouth daily x 3 days, then 2 tabs daily x 3 days, then 1 tab daily x 3 days, then 1/2 tab daily x 3 days. 20 tablet 0    traZODone (DESYREL) 50 MG tablet Take 2 tablets (100 mg) by mouth nightly as needed for sleep 180 tablet 4     Allergies   Allergen Reactions    Polymyxin B Visual Disturbance     Severe burning and redness in eyes.     Past medical history, past surgical history, current medications and allergies reviewed and accurate to the best of my knowledge.      ROS:  Refer to HPI    /80 (BP Location: Left arm, Patient Position: Sitting, Cuff Size: Adult Large)   Pulse 57   Temp 98  F (36.7  C) (Temporal)   Resp 12   Ht 1.626 m (5' 4\")   Wt 80.5 kg (177 lb 8 oz)   SpO2 98%   BMI 30.47 kg/m      EXAM:  General Appearance: Well appearing 39 year old female, appropriate appearance for age. No acute distress   Neck: supple without adenopathy  Respiratory: normal chest wall and respirations.  Normal effort.  Clear to auscultation bilaterally, no wheezing, crackles or rhonchi.  No increased work of breathing.  No cough appreciated.  Cardiac: RRR with no murmurs  Musculoskeletal:  Equal movement of bilateral upper extremities.  Equal movement of bilateral lower extremities.  Normal gait.    Neuro: Alert and oriented to person, place, and time.    Psychological: normal affect, alert, oriented, and pleasant.     Xray:  Results for orders placed or performed during the hospital encounter of 09/18/24   MA Diagnostic Left w/Nitish     Status: None    Narrative    EXAM: MA DIAGNOSTIC LEFT W/ NITISH, US BREAST LEFT LIMITED 1-3  QUADRANTS, 9/18/2024 1:31 PM    COMPARISONS: 9/5/2024    HISTORY: Abnormal finding on breast imaging    BREAST DENSITY: The breasts are heterogeneously dense, which may  obscure small masses.    FINDINGS: There is focal dense breast tissue in the outer breast  without a definite mass. No architectural distortion or " suspicious  microcalcifications.    Targeted ultrasound of the left breast was performed. No suspicious  sonographic abnormality. No cystic or solid mass.      Impression    IMPRESSION: BI-RADS CATEGORY: 1 -  Negative.      RECOMMENDED FOLLOW-UP: Routine yearly mammography beginning at age 40  or as discussed with your provider..    PAZ FLORES MD         SYSTEM ID:  [id number]   Results for orders placed or performed during the hospital encounter of 09/18/24   US Breast Left Limited 1-3 Quadrants     Status: None    Narrative    EXAM: MA DIAGNOSTIC LEFT W/ NITISH, US BREAST LEFT LIMITED 1-3  QUADRANTS, 9/18/2024 1:31 PM    COMPARISONS: 9/5/2024    HISTORY: Abnormal finding on breast imaging    BREAST DENSITY: The breasts are heterogeneously dense, which may  obscure small masses.    FINDINGS: There is focal dense breast tissue in the outer breast  without a definite mass. No architectural distortion or suspicious  microcalcifications.    Targeted ultrasound of the left breast was performed. No suspicious  sonographic abnormality. No cystic or solid mass.      Impression    IMPRESSION: BI-RADS CATEGORY: 1 -  Negative.      RECOMMENDED FOLLOW-UP: Routine yearly mammography beginning at age 40  or as discussed with your provider..    PAZ FLORES MD         SYSTEM ID:  [id number]   Results for orders placed or performed in visit on 09/18/24   XR Cervical Spine 2/3 Views     Status: None    Narrative    Exam: XR CERVICAL SPINE 2/3 VIEWS     Exam reason: Neck pain on right side; Acute pain of right shoulder;  Muscle twitching    Comparison: None.    Findings:    There is grade 1 anterolisthesis of C4 on C5, likely degenerative.  There is reversal of the typical cervical lordosis centered at C5.    No acute fracture.    There is multilevel disc space narrowing and multilevel facet  arthropathy.    Prevertebral soft tissues are normal in caliber and contour.      Impression    Impression:   No acute  fracture.    Multilevel degenerative changes of the cervical spine.    PZA FLORES MD         SYSTEM ID:  [id number]   XR Thoracic Spine 2 Views     Status: None    Narrative    Exam: XR THORACIC SPINE 2 VIEWS    Exam reason: Neck pain on right side; Acute pain of right shoulder;  Muscle twitching    Technique: AP and lateral views were obtained.    Comparison: None    Findings:     No acute fracture or subluxation.    Normal alignment.     No significant degenerative changes.    Paraspinous soft tissues are unremarkable.      Impression    Impression:    No acute fracture or subluxation.    PAZ FLORES MD         SYSTEM ID:  [id number]

## 2024-09-18 NOTE — NURSING NOTE
"Chief Complaint   Patient presents with    Shoulder Pain     Right shoulder. Started nine days ago. Runs from neck to mid back and right arm. Muscle spasms in arm. Pain varies from 3-5    Neck Pain     Started nine days ago.       Initial /80 (BP Location: Left arm, Patient Position: Sitting, Cuff Size: Adult Large)   Pulse 57   Temp 98  F (36.7  C) (Temporal)   Resp 12   Ht 1.626 m (5' 4\")   Wt 80.5 kg (177 lb 8 oz)   SpO2 98%   BMI 30.47 kg/m   Estimated body mass index is 30.47 kg/m  as calculated from the following:    Height as of this encounter: 1.626 m (5' 4\").    Weight as of this encounter: 80.5 kg (177 lb 8 oz).  Medication Review: complete    The next two questions are to help us understand your food security.  If you are feeling you need any assistance in this area, we have resources available to support you today.          7/30/2024   SDOH- Food Insecurity   Within the past 12 months, did you worry that your food would run out before you got money to buy more? N   Within the past 12 months, did the food you bought just not last and you didn t have money to get more? N                Jones Headley      "

## 2024-09-26 ENCOUNTER — TELEPHONE (OUTPATIENT)
Dept: FAMILY MEDICINE | Facility: OTHER | Age: 39
End: 2024-09-26
Payer: COMMERCIAL

## 2024-09-26 NOTE — TELEPHONE ENCOUNTER
Received call from [facility] with Dr. Jenkins.  They are asking for a returned call to Lorin at [phone].  She states the patient needs a MRI before Dr. Jenkins appt.

## 2024-09-26 NOTE — TELEPHONE ENCOUNTER
Please call patient and see if she is continuing to have neck pain.  It looks like [facility] on 9/18/2024.  Typically I would not send a referral to spine surgeon based on this, we would recommend conservative treatment, physical therapy, others.  Before I order an MRI, I want to see if she is still need is needing/wanting this.  Jessica Molina, DAX CNP on 9/26/2024 at 12:38 PM

## 2024-10-17 NOTE — TELEPHONE ENCOUNTER
Called and spoke with patient she did get a call from the spine center and will need to do PT first. She says to disregard this message. Matilde Rios LPN .......................10/17/2024  9:51 AM

## 2025-07-21 ENCOUNTER — PATIENT OUTREACH (OUTPATIENT)
Dept: CARE COORDINATION | Facility: CLINIC | Age: 40
End: 2025-07-21
Payer: COMMERCIAL

## 2025-08-04 ENCOUNTER — PATIENT OUTREACH (OUTPATIENT)
Dept: CARE COORDINATION | Facility: CLINIC | Age: 40
End: 2025-08-04
Payer: COMMERCIAL

## 2025-08-30 ENCOUNTER — HEALTH MAINTENANCE LETTER (OUTPATIENT)
Age: 40
End: 2025-08-30